# Patient Record
Sex: MALE | Race: WHITE | NOT HISPANIC OR LATINO | Employment: OTHER | URBAN - METROPOLITAN AREA
[De-identification: names, ages, dates, MRNs, and addresses within clinical notes are randomized per-mention and may not be internally consistent; named-entity substitution may affect disease eponyms.]

---

## 2017-03-21 ENCOUNTER — HISTORICAL (OUTPATIENT)
Dept: LAB | Facility: HOSPITAL | Age: 72
End: 2017-03-21

## 2017-03-22 ENCOUNTER — HISTORICAL (OUTPATIENT)
Dept: ANESTHESIOLOGY | Facility: HOSPITAL | Age: 72
End: 2017-03-22

## 2017-04-18 ENCOUNTER — HISTORICAL (OUTPATIENT)
Dept: CARDIAC REHAB | Facility: HOSPITAL | Age: 72
End: 2017-04-18

## 2017-04-19 ENCOUNTER — HISTORICAL (OUTPATIENT)
Dept: CARDIAC REHAB | Facility: HOSPITAL | Age: 72
End: 2017-04-19

## 2017-04-21 ENCOUNTER — HISTORICAL (OUTPATIENT)
Dept: CARDIAC REHAB | Facility: HOSPITAL | Age: 72
End: 2017-04-21

## 2017-04-24 ENCOUNTER — HISTORICAL (OUTPATIENT)
Dept: CARDIAC REHAB | Facility: HOSPITAL | Age: 72
End: 2017-04-24

## 2017-04-26 ENCOUNTER — HISTORICAL (OUTPATIENT)
Dept: CARDIAC REHAB | Facility: HOSPITAL | Age: 72
End: 2017-04-26

## 2017-04-28 ENCOUNTER — HISTORICAL (OUTPATIENT)
Dept: CARDIAC REHAB | Facility: HOSPITAL | Age: 72
End: 2017-04-28

## 2017-05-01 ENCOUNTER — HISTORICAL (OUTPATIENT)
Dept: CARDIAC REHAB | Facility: HOSPITAL | Age: 72
End: 2017-05-01

## 2017-05-03 ENCOUNTER — HISTORICAL (OUTPATIENT)
Dept: CARDIAC REHAB | Facility: HOSPITAL | Age: 72
End: 2017-05-03

## 2017-05-05 ENCOUNTER — HISTORICAL (OUTPATIENT)
Dept: CARDIAC REHAB | Facility: HOSPITAL | Age: 72
End: 2017-05-05

## 2017-05-08 ENCOUNTER — HISTORICAL (OUTPATIENT)
Dept: CARDIAC REHAB | Facility: HOSPITAL | Age: 72
End: 2017-05-08

## 2017-05-10 ENCOUNTER — HISTORICAL (OUTPATIENT)
Dept: CARDIAC REHAB | Facility: HOSPITAL | Age: 72
End: 2017-05-10

## 2017-05-15 ENCOUNTER — HISTORICAL (OUTPATIENT)
Dept: CARDIAC REHAB | Facility: HOSPITAL | Age: 72
End: 2017-05-15

## 2017-05-17 ENCOUNTER — HISTORICAL (OUTPATIENT)
Dept: CARDIAC REHAB | Facility: HOSPITAL | Age: 72
End: 2017-05-17

## 2017-05-19 ENCOUNTER — HISTORICAL (OUTPATIENT)
Dept: CARDIAC REHAB | Facility: HOSPITAL | Age: 72
End: 2017-05-19

## 2017-05-22 ENCOUNTER — HISTORICAL (OUTPATIENT)
Dept: CARDIAC REHAB | Facility: HOSPITAL | Age: 72
End: 2017-05-22

## 2017-05-24 ENCOUNTER — HISTORICAL (OUTPATIENT)
Dept: CARDIAC REHAB | Facility: HOSPITAL | Age: 72
End: 2017-05-24

## 2017-05-26 ENCOUNTER — HISTORICAL (OUTPATIENT)
Dept: CARDIAC REHAB | Facility: HOSPITAL | Age: 72
End: 2017-05-26

## 2017-05-31 ENCOUNTER — HISTORICAL (OUTPATIENT)
Dept: CARDIAC REHAB | Facility: HOSPITAL | Age: 72
End: 2017-05-31

## 2017-07-27 ENCOUNTER — HISTORICAL (OUTPATIENT)
Dept: LAB | Facility: HOSPITAL | Age: 72
End: 2017-07-27

## 2017-08-23 ENCOUNTER — HISTORICAL (OUTPATIENT)
Dept: ENDOSCOPY | Facility: HOSPITAL | Age: 72
End: 2017-08-23

## 2018-03-15 ENCOUNTER — HISTORICAL (OUTPATIENT)
Dept: LAB | Facility: HOSPITAL | Age: 73
End: 2018-03-15

## 2018-03-15 ENCOUNTER — HISTORICAL (OUTPATIENT)
Dept: ADMINISTRATIVE | Facility: HOSPITAL | Age: 73
End: 2018-03-15

## 2018-03-15 LAB
(HCYS)2 SERPL-MCNC: 8.8 MCMOL/L (ref 3.2–10.7)
ALBUMIN SERPL-MCNC: 4.8 GM/DL (ref 3.4–5)
ALBUMIN/GLOB SERPL: 2.67 {RATIO} (ref 1.5–2.5)
ALP SERPL-CCNC: 69 UNIT/L (ref 38–126)
ALT SERPL-CCNC: 55 UNIT/L (ref 7–52)
AST SERPL-CCNC: 34 UNIT/L (ref 15–37)
BILIRUB SERPL-MCNC: 0.8 MG/DL (ref 0.2–1)
BILIRUBIN DIRECT+TOT PNL SERPL-MCNC: <0.2 MG/DL (ref 0–0.5)
BUN SERPL-MCNC: 13 MG/DL (ref 7–18)
CALCIUM SERPL-MCNC: 9.9 MG/DL (ref 8.5–10)
CHLORIDE SERPL-SCNC: 105 MMOL/L (ref 98–107)
CHOLEST SERPL-MCNC: 316 MG/DL (ref 0–200)
CHOLEST/HDLC SERPL: 8.3 {RATIO}
CO2 SERPL-SCNC: 30 MMOL/L (ref 21–32)
CREAT SERPL-MCNC: 1.05 MG/DL (ref 0.6–1.3)
CREAT/UREA NIT SERPL: 12.4
GGT SERPL-CCNC: 57 UNIT/L (ref 5–85)
GLOBULIN SER-MCNC: 1.8 GM/DL (ref 1.2–3)
GLUCOSE SERPL-MCNC: 99 MG/DL (ref 74–106)
HDLC SERPL-MCNC: 38 MG/DL (ref 35–60)
LDH SERPL-CCNC: 136 UNIT/L (ref 140–271)
LDLC SERPL CALC-MCNC: 150 MG/DL (ref 0–129)
POTASSIUM SERPL-SCNC: 4.4 MMOL/L (ref 3.5–5.1)
PROT SERPL-MCNC: 6.6 GM/DL (ref 6.4–8.2)
SODIUM SERPL-SCNC: 139 MMOL/L (ref 136–145)
T3FREE SERPL-MCNC: 1.93 PG/ML (ref 1.45–3.48)
T4 FREE SERPL-MCNC: <1 NG/DL (ref 0.76–1.46)
TRIGL SERPL-MCNC: 371 MG/DL (ref 30–150)
TSH SERPL-ACNC: 1.09 MIU/ML (ref 0.35–4.94)
VLDLC SERPL CALC-MCNC: 74.2 MG/DL

## 2018-09-05 ENCOUNTER — HISTORICAL (OUTPATIENT)
Dept: LAB | Facility: HOSPITAL | Age: 73
End: 2018-09-05

## 2018-09-05 ENCOUNTER — HISTORICAL (OUTPATIENT)
Dept: ADMINISTRATIVE | Facility: HOSPITAL | Age: 73
End: 2018-09-05

## 2018-09-05 LAB
ALBUMIN SERPL-MCNC: 4.5 GM/DL (ref 3.4–5)
ALBUMIN/GLOB SERPL: 1.55 {RATIO} (ref 1.5–2.5)
ALP SERPL-CCNC: 60 UNIT/L (ref 38–126)
ALT SERPL-CCNC: 27 UNIT/L (ref 7–52)
AST SERPL-CCNC: 23 UNIT/L (ref 15–37)
BILIRUB SERPL-MCNC: 0.6 MG/DL (ref 0.2–1)
BILIRUBIN DIRECT+TOT PNL SERPL-MCNC: 0.1 MG/DL (ref 0–0.5)
BILIRUBIN DIRECT+TOT PNL SERPL-MCNC: 0.5 MG/DL
BUN SERPL-MCNC: 14 MG/DL (ref 7–18)
CALCIUM SERPL-MCNC: 9.4 MG/DL (ref 8.5–10)
CHLORIDE SERPL-SCNC: 106 MMOL/L (ref 98–107)
CHOLEST SERPL-MCNC: 235 MG/DL (ref 0–200)
CHOLEST/HDLC SERPL: 5.9 {RATIO}
CO2 SERPL-SCNC: 29 MMOL/L (ref 21–32)
CREAT SERPL-MCNC: 0.89 MG/DL (ref 0.6–1.3)
DEPRECATED CALCIDIOL+CALCIFEROL SERPL-MC: 90.1 NG/ML (ref 30–80)
EST. AVERAGE GLUCOSE BLD GHB EST-MCNC: 97 MG/DL
GLOBULIN SER-MCNC: 2.9 GM/DL (ref 1.2–3)
GLUCOSE SERPL-MCNC: 106 MG/DL (ref 74–106)
HBA1C MFR BLD: 5 % (ref 4.4–6.4)
HDLC SERPL-MCNC: 40 MG/DL (ref 35–60)
LDLC SERPL CALC-MCNC: 127 MG/DL (ref 0–129)
POTASSIUM SERPL-SCNC: 4.5 MMOL/L (ref 3.5–5.1)
PROT SERPL-MCNC: 7.4 GM/DL (ref 6.4–8.2)
SODIUM SERPL-SCNC: 140 MMOL/L (ref 136–145)
T3FREE SERPL-MCNC: 2.34 PG/ML (ref 2.18–3.98)
TRIGL SERPL-MCNC: 297 MG/DL (ref 30–150)
TSH SERPL-ACNC: 0.65 MIU/L (ref 0.36–3.74)
VLDLC SERPL CALC-MCNC: 59.4 MG/DL

## 2018-09-06 LAB — T4 FREE SERPL-MCNC: 1.08 NG/DL (ref 0.76–1.46)

## 2018-10-17 ENCOUNTER — HOSPITAL ENCOUNTER (OUTPATIENT)
Dept: MEDSURG UNIT | Facility: HOSPITAL | Age: 73
End: 2018-10-20
Attending: INTERNAL MEDICINE | Admitting: INTERNAL MEDICINE

## 2018-10-17 LAB
ABS NEUT (OLG): 4.03 X10(3)/MCL (ref 2.1–9.2)
ALBUMIN SERPL-MCNC: 3.7 GM/DL (ref 3.4–5)
ALBUMIN/GLOB SERPL: 1.1 RATIO (ref 1.1–2)
ALP SERPL-CCNC: 92 UNIT/L (ref 50–136)
ALT SERPL-CCNC: 48 UNIT/L (ref 12–78)
AST SERPL-CCNC: 28 UNIT/L (ref 15–37)
BASOPHILS # BLD AUTO: 0.1 X10(3)/MCL (ref 0–0.2)
BASOPHILS NFR BLD AUTO: 1 %
BILIRUB SERPL-MCNC: 0.4 MG/DL (ref 0.2–1)
BILIRUBIN DIRECT+TOT PNL SERPL-MCNC: 0.1 MG/DL (ref 0–0.5)
BILIRUBIN DIRECT+TOT PNL SERPL-MCNC: 0.3 MG/DL (ref 0–0.8)
BUN SERPL-MCNC: 15 MG/DL (ref 7–18)
CALCIUM SERPL-MCNC: 9 MG/DL (ref 8.5–10.1)
CHLORIDE SERPL-SCNC: 104 MMOL/L (ref 98–107)
CO2 SERPL-SCNC: 29 MMOL/L (ref 21–32)
CREAT SERPL-MCNC: 1.2 MG/DL (ref 0.7–1.3)
EOSINOPHIL # BLD AUTO: 0.3 X10(3)/MCL (ref 0–0.9)
EOSINOPHIL NFR BLD AUTO: 3 %
ERYTHROCYTE [DISTWIDTH] IN BLOOD BY AUTOMATED COUNT: 12.5 % (ref 11.5–17)
GLOBULIN SER-MCNC: 3.5 GM/DL (ref 2.4–3.5)
GLUCOSE SERPL-MCNC: 89 MG/DL (ref 74–106)
HCT VFR BLD AUTO: 44.7 % (ref 42–52)
HGB BLD-MCNC: 15.2 GM/DL (ref 14–18)
LYMPHOCYTES # BLD AUTO: 4 X10(3)/MCL (ref 0.6–4.6)
LYMPHOCYTES NFR BLD AUTO: 42 %
MCH RBC QN AUTO: 30.5 PG (ref 27–31)
MCHC RBC AUTO-ENTMCNC: 34 GM/DL (ref 33–36)
MCV RBC AUTO: 89.8 FL (ref 80–94)
MONOCYTES # BLD AUTO: 0.9 X10(3)/MCL (ref 0.1–1.3)
MONOCYTES NFR BLD AUTO: 10 %
NEUTROPHILS # BLD AUTO: 4.03 X10(3)/MCL (ref 2.1–9.2)
NEUTROPHILS NFR BLD AUTO: 43 %
PLATELET # BLD AUTO: 200 X10(3)/MCL (ref 130–400)
PMV BLD AUTO: 9.4 FL (ref 9.4–12.4)
POC TROPONIN: 0.01 NG/ML (ref 0–0.08)
POTASSIUM SERPL-SCNC: 3.6 MMOL/L (ref 3.5–5.1)
PROT SERPL-MCNC: 7.2 GM/DL (ref 6.4–8.2)
RBC # BLD AUTO: 4.98 X10(6)/MCL (ref 4.7–6.1)
SODIUM SERPL-SCNC: 139 MMOL/L (ref 136–145)
TROPONIN I SERPL-MCNC: <0.02 NG/ML (ref 0.02–0.49)
WBC # SPEC AUTO: 9.4 X10(3)/MCL (ref 4.5–11.5)

## 2018-10-18 LAB
TROPONIN I SERPL-MCNC: 0.27 NG/ML (ref 0.02–0.49)
TROPONIN I SERPL-MCNC: 0.44 NG/ML (ref 0.02–0.49)
TROPONIN I SERPL-MCNC: 0.45 NG/ML (ref 0.02–0.49)

## 2018-10-20 LAB
ABS NEUT (OLG): 4.96 X10(3)/MCL (ref 2.1–9.2)
ALBUMIN SERPL-MCNC: 3.3 GM/DL (ref 3.4–5)
ALBUMIN/GLOB SERPL: 1.2 RATIO (ref 1.1–2)
ALP SERPL-CCNC: 67 UNIT/L (ref 50–136)
ALT SERPL-CCNC: 43 UNIT/L (ref 12–78)
AST SERPL-CCNC: 27 UNIT/L (ref 15–37)
BASOPHILS # BLD AUTO: 0 X10(3)/MCL (ref 0–0.2)
BASOPHILS NFR BLD AUTO: 1 %
BILIRUB SERPL-MCNC: 0.4 MG/DL (ref 0.2–1)
BILIRUBIN DIRECT+TOT PNL SERPL-MCNC: 0.1 MG/DL (ref 0–0.5)
BILIRUBIN DIRECT+TOT PNL SERPL-MCNC: 0.3 MG/DL (ref 0–0.8)
BUN SERPL-MCNC: 16 MG/DL (ref 7–18)
CALCIUM SERPL-MCNC: 8.6 MG/DL (ref 8.5–10.1)
CHLORIDE SERPL-SCNC: 107 MMOL/L (ref 98–107)
CO2 SERPL-SCNC: 28 MMOL/L (ref 21–32)
CREAT SERPL-MCNC: 0.94 MG/DL (ref 0.7–1.3)
EOSINOPHIL # BLD AUTO: 0.2 X10(3)/MCL (ref 0–0.9)
EOSINOPHIL NFR BLD AUTO: 2 %
ERYTHROCYTE [DISTWIDTH] IN BLOOD BY AUTOMATED COUNT: 12.4 % (ref 11.5–17)
GLOBULIN SER-MCNC: 2.8 GM/DL (ref 2.4–3.5)
GLUCOSE SERPL-MCNC: 91 MG/DL (ref 74–106)
HCT VFR BLD AUTO: 46.4 % (ref 42–52)
HGB BLD-MCNC: 15.2 GM/DL (ref 14–18)
LYMPHOCYTES # BLD AUTO: 2.4 X10(3)/MCL (ref 0.6–4.6)
LYMPHOCYTES NFR BLD AUTO: 29 %
MCH RBC QN AUTO: 29.9 PG (ref 27–31)
MCHC RBC AUTO-ENTMCNC: 32.8 GM/DL (ref 33–36)
MCV RBC AUTO: 91.2 FL (ref 80–94)
MONOCYTES # BLD AUTO: 0.7 X10(3)/MCL (ref 0.1–1.3)
MONOCYTES NFR BLD AUTO: 8 %
NEUTROPHILS # BLD AUTO: 4.96 X10(3)/MCL (ref 2.1–9.2)
NEUTROPHILS NFR BLD AUTO: 59 %
PLATELET # BLD AUTO: 178 X10(3)/MCL (ref 130–400)
PMV BLD AUTO: 9.1 FL (ref 9.4–12.4)
POTASSIUM SERPL-SCNC: 4.3 MMOL/L (ref 3.5–5.1)
PROT SERPL-MCNC: 6.1 GM/DL (ref 6.4–8.2)
RBC # BLD AUTO: 5.09 X10(6)/MCL (ref 4.7–6.1)
SODIUM SERPL-SCNC: 142 MMOL/L (ref 136–145)
WBC # SPEC AUTO: 8.4 X10(3)/MCL (ref 4.5–11.5)

## 2019-02-27 ENCOUNTER — HISTORICAL (OUTPATIENT)
Dept: ADMINISTRATIVE | Facility: HOSPITAL | Age: 74
End: 2019-02-27

## 2019-02-27 LAB
FLUAV AG UPPER RESP QL IA.RAPID: NEGATIVE
FLUBV AG UPPER RESP QL IA.RAPID: NEGATIVE

## 2019-12-02 ENCOUNTER — HISTORICAL (OUTPATIENT)
Dept: LAB | Facility: HOSPITAL | Age: 74
End: 2019-12-02

## 2020-08-18 ENCOUNTER — HISTORICAL (OUTPATIENT)
Dept: PREADMISSION TESTING | Facility: HOSPITAL | Age: 75
End: 2020-08-18

## 2020-08-18 LAB — PSA SERPL-MCNC: 0.38 NG/ML

## 2021-09-07 ENCOUNTER — HISTORICAL (OUTPATIENT)
Dept: ADMINISTRATIVE | Facility: HOSPITAL | Age: 76
End: 2021-09-07

## 2021-09-07 LAB
ALBUMIN SERPL-MCNC: 4.5 GM/DL (ref 3.4–5)
ALBUMIN/GLOB SERPL: 2.25 {RATIO} (ref 1.5–2.5)
ALP SERPL-CCNC: 62 UNIT/L (ref 38–126)
ALT SERPL-CCNC: 41 UNIT/L (ref 7–52)
AST SERPL-CCNC: 32 UNIT/L (ref 15–37)
BILIRUB SERPL-MCNC: 0.5 MG/DL (ref 0.2–1)
BILIRUBIN DIRECT+TOT PNL SERPL-MCNC: 0.1 MG/DL (ref 0–0.5)
BILIRUBIN DIRECT+TOT PNL SERPL-MCNC: 0.4 MG/DL
BUN SERPL-MCNC: 16 MG/DL (ref 7–18)
CALCIUM SERPL-MCNC: 9.3 MG/DL (ref 8.5–10.1)
CHLORIDE SERPL-SCNC: 105 MMOL/L (ref 98–107)
CHOLEST SERPL-MCNC: 159 MG/DL (ref 0–200)
CHOLEST/HDLC SERPL: 3.3 {RATIO}
CO2 SERPL-SCNC: 30 MMOL/L (ref 21–32)
CREAT SERPL-MCNC: 1.08 MG/DL (ref 0.6–1.3)
GLOBULIN SER-MCNC: 2 GM/DL (ref 1.2–3)
GLUCOSE SERPL-MCNC: 101 MG/DL (ref 74–106)
HDLC SERPL-MCNC: 48 MG/DL (ref 35–60)
LDLC SERPL CALC-MCNC: 50 MG/DL (ref 0–129)
POTASSIUM SERPL-SCNC: 4 MMOL/L (ref 3.5–5.1)
PROT SERPL-MCNC: 6.5 GM/DL (ref 6.4–8.2)
SODIUM SERPL-SCNC: 142 MMOL/L (ref 136–145)
TRIGL SERPL-MCNC: 268 MG/DL (ref 30–150)
TSH SERPL-ACNC: 0.84 MIU/ML (ref 0.35–4.94)
VLDLC SERPL CALC-MCNC: 53.6 MG/DL

## 2021-11-15 ENCOUNTER — HISTORICAL (OUTPATIENT)
Dept: ADMINISTRATIVE | Facility: HOSPITAL | Age: 76
End: 2021-11-15

## 2021-11-15 LAB — SARS-COV-2 AG RESP QL IA.RAPID: NEGATIVE

## 2021-11-16 ENCOUNTER — HISTORICAL (OUTPATIENT)
Dept: ADMINISTRATIVE | Facility: HOSPITAL | Age: 76
End: 2021-11-16

## 2022-04-11 ENCOUNTER — HISTORICAL (OUTPATIENT)
Dept: ADMINISTRATIVE | Facility: HOSPITAL | Age: 77
End: 2022-04-11

## 2022-04-25 VITALS
HEIGHT: 73 IN | OXYGEN SATURATION: 96 % | WEIGHT: 216.06 LBS | SYSTOLIC BLOOD PRESSURE: 110 MMHG | BODY MASS INDEX: 28.63 KG/M2 | DIASTOLIC BLOOD PRESSURE: 60 MMHG

## 2022-04-30 NOTE — OP NOTE
DATE OF SURGERY:    11/16/2021    SURGEON:  Jerald Powers MD    PREOPERATIVE DIAGNOSIS:  Left renal calculus.    POSTOPERATIVE DIAGNOSIS:  Left renal calculus.    PROCEDURE:    1. Cystoscopy with left retrograde pyelogram.  2. Left ureteroscopy with laser lithotripsy.  3. Left ureteral stent 6-Nigerien by 28 cm.    OPERATIVE NOTE:  After informed consent was obtained, the patient was taken to the operating room.  After receiving a preoperative dose of antibiotics, he was given a general anesthetic and placed in a dorsal lithotomy position.  His genitals were prepped and draped in usual sterile fashion.  I passed a 22-Nigerien rigid scope through the urethra into the bladder.  The left ureteral orifice was identified.  I cannulated it with an open-ended ureteral catheter.  I performed a retrograde pyelogram which showed normal caliber ureter.  He did have a narrowing at the ureteropelvic junction with a mildly dilated renal pelvis.  I placed a 2nd Glidewire up and passed a flexible scope up into the proximal ureter where a small stone was seen on CT scan after emergency room visit several weeks ago.  There was no stone up in the proximal ureter.  I went up in the renal pelvis and I investigated the upper, middle, and lower pole calices.  He had a relatively large stone in the lower pole calyx and I lasered this with a 200 micron fiber and the Holmium laser and used a Nitinol basket to remove several of the fragments.  Following this, I placed a 6-Nigerien by 28 cm double-J stent into good position.  He tolerated the procedure well.  There were no complications.  He was extubated and brought to recovery in good condition.        ______________________________  Jerald Powers MD    WBR/SF  DD:  11/16/2021  Time:  07:11PM  DT:  11/16/2021  Time:  07:31PM  Job #:  [id number]

## 2022-04-30 NOTE — ED PROVIDER NOTES
Patient:   Lionel Brown            MRN: [id number]            FIN: [id number]               Age:   73 years     Sex:  Male     :  1945   Associated Diagnoses:   Unstable angina   Author:   Tere Wells MD      Basic Information   Time seen: Date & time 10/17/2018 20:29:00.   History source: Patient.   Arrival mode: Private vehicle, wheelchair.   Additional information: Chief Complaint from Nursing Triage Note : Chief Complaint   10/17/2018 20:23 CDT     Chief Complaint           pt presents to ED with c/o chest pain and left arm pain x 10 min. MI approx 1.5 yrs ago. bypass in . 6 stents placed. pt denies any SOB.  .      History of Present Illness   The patient presents with chest pain, patient presents to ER today with complaint of chest pain that radiates to his left arm for the past 10 minutes.  Patient had a heart attack 1.5 years ago.  Had bypass surgery in .  Denies shortness of breath./ eddie figueroa pa-c and I, Tere Wells MD, assumed care of this patient at .     73-year-old male with a history of coronary artery disease status post coronary artery bypass surgery in  and 6 subsequent stents, last MI 2017 presents to the emergency department complaining of chest pain.  He states that around 6:00 tonight he started feeling some general discomfort in his chest she took a nitroglycerin and the symptoms improved.  Approximately one hour later he started developing left arm pain and chest tightness again, he took another nitroglycerin at that time and again symptoms improved.  Around 8 PM the left arm pain became severe, similar to his prior MI pain, so he took an additional nitroglycerin and came to the emergency department.  At time of my evaluation, his symptoms are resolved.  He denies any associated shortness of breath, diaphoresis, nausea or vomiting.  He denies any recent fever or chills.  Patient took 324 mg of aspirin prior to coming to the emergency  department.  The onset was 3  hours ago.  The course/duration of symptoms is fluctuating in intensity.  Location: Left anterior chest. Radiating pain: left arm. The character of symptoms is tightness and sharp.  The degree at onset was moderate.  The degree at maximum was severe.  The degree at present is none.  The exacerbating factor is none.  Risk factors consist of coronary artery disease.  Prior episodes: angina, coronary artery disease and last myocardial infarction was  04/2017.  Therapy today Nitroglycerin and Aspirin.  Associated symptoms: none.        Review of Systems   Constitutional symptoms:  No fever, no chills.    Skin symptoms:  No jaundice, no rash.    Eye symptoms:  Vision unchanged, No blurred vision,    Respiratory symptoms:  No shortness of breath, no orthopnea, no cough, no sputum production.    Cardiovascular symptoms:  Chest pain, no palpitations, no diaphoresis, no peripheral edema.    Gastrointestinal symptoms:  No abdominal pain, no nausea, no vomiting, no diarrhea, no constipation.    Genitourinary symptoms:  No dysuria, no hematuria.    Musculoskeletal symptoms:  Muscle pain.   Neurologic symptoms:  No headache, no dizziness.    Allergy/immunologic symptoms:  No impaired immunity,              Additional review of systems information: All other systems reviewed and otherwise negative.      Health Status   Allergies:    Allergic Reactions (Selected)  Severity Not Documented  Codeine- No reactions were documented.  Cortisone- Glaucoma.,    Allergies (2) Active Reaction  codeine None Documented  cortisone glaucoma.   Medications:  (Selected)   Prescriptions  Prescribed  Promethazine DM 6.25 mg-15 mg/5 mL oral syrup: 5 mL, Oral, q6hr, PRN PRN for cough, # 120 mL, 0 Refill(s), [facility]  amoxicillin 500 mg oral capsule: 500 mg = 1 cap(s), Oral, BID, # 14 cap(s), 1 Refill(s), [facility]  metoprolol tartrate 25 mg oral tab: 12.5 mg =  0.5 tab(s), Oral, BID, # 15 tab(s), 0 Refill(s)  ticagrelor 90 mg oral tablet: 90 mg = 1 tab(s), Oral, BID, # 60 tab(s), 0 Refill(s)  Documented Medications  Documented  Acidophilus oral capsule: = 1 cap(s), Oral, Daily, 0 Refill(s)  LATANOPROST 0.005% EYE DROPS:   Lexapro 10 mg oral tablet: 0 Refill(s)  Plavix 75 mg oral tablet: 0 Refill(s)  Tumeric: Tumeric, 1 capsule, Oral, Daily, 0 Refill(s)  Vitamin D 1000 intl units oral tablet: 5000iu, Oral, BID, 5000 IU BID, 0 Refill(s)  aspirin 81 mg oral tablet, CHEWABLE: 81 mg = 1 tab(s), Oral, Daily, # 30 tab(s), 0 Refill(s)  glucosamine hydrochloride 1500 mg oral tablet: 1,500 mg = 1 tab(s), Oral, Daily, # 30 tab(s), 0 Refill(s)  multivitamin with minerals (Adult Tab): 1 tab(s), Oral, Daily, 0 Refill(s)  omega-3 polyunsaturated fatty acids oral cap: See Instructions, 1400 mg daily, 0 Refill(s)  red yeast rice 600 mg oral capsule: 1,200 mg = 2 cap(s), Oral, BID, 0 Refill(s)  vitamin E: 0 Refill(s).      Past Medical/ Family/ Social History   Medical history:    Active  CAD (coronary artery disease) (414.00)  Resolved  hearing loss (79659591):  Resolved.  visual disorder (301042311):  Resolved.  high cholesterol (909420201):  Resolved.  sleep apnea (557633787):  Resolved.  arthritis (6420915):  Resolved.  anxiety (73276114):  Resolved.  depression (86880309):  Resolved., Reviewed as documented in chart.   Surgical history:    Colonoscopy on 8/23/2017 at 72 Years.  Comments:  8/23/2017 10:27 - Alvina PRUITT, Erik Victoria  auto-populated from documented surgical case  History of coronary artery stent placement ([id number]) on 4/7/2017 at 72 Years.  History of coronary artery stent placement ([id number]) on 8/20/2015 at 70 Years.  Coronary artery bypass, vein only; 2 coronary venous grafts (18554) in 1992 at 47 Years.  Appendectomy; (08006).  Cholecystectomy; (63793).  tonsilectomy., Reviewed as documented in chart.   Family history:    Primary malignant neoplasm of  kidney  Brother (Zachary)  Stroke  Mother  Accidental  Brother ()  Coronary artery disease  Father  Mother  Brother ()  Brother (Zachary)  Hypertension.  Brother ()  Brother (Shakeel)  Glaucoma.  Mother  , Reviewed as documented in chart.   Social history: Reviewed as documented in chart.   Problem list:    Active Problems (8)  Ankylosing spondylitis   CAD (coronary artery disease)   Cyst of kidney   Fatty liver   Glaucoma   Hypercholesterolemia   Hypertriglyceridemia   Sleep apnea .      Physical Examination               Vital Signs   Vital Signs   10/17/2018 20:23 CDT     Temperature Oral          36.8 DegC                             Temperature Oral (calculated)             98.24 DegF                             Peripheral Pulse Rate     60 bpm                             Respiratory Rate          19 br/min                             SpO2                      99 %                             Oxygen Therapy            Aerosol mask                             Systolic Blood Pressure   134 mmHg                             Diastolic Blood Pressure  79 mmHg  .      Vital Signs (last 24 hrs)_____  Last Charted___________  Temp Oral     36.8 DegC  (OCT 17 20:23)  Heart Rate Peripheral   60 bpm  (OCT 17 20:23)  Resp Rate         19 br/min  (OCT 17 20:)  SBP      134 mmHg  (OCT 17 20:)  DBP      79 mmHg  (OCT 17 20:)  SpO2      99 %  (OCT 17 20:23).   Measurements   10/17/2018 20:23 CDT     Weight Dosing             95.5 kg                             Weight Measured and Calculated in Lbs     210.54 lb                             Weight Estimated          95.5 kg                             Height/Length Dosing      182.88 cm                             Height/Length Estimated   182.88 cm                             Body Mass Index Estimated 28.55 kg/m2  .   Basic Oxygen Information   10/17/2018 20:23 CDT     SpO2                      99 %                             Oxygen Therapy             Aerosol mask  .   General:  Alert, no acute distress.    Skin:  Warm, dry.    Head:  Normocephalic, atraumatic.    Neck:  Supple, trachea midline.    Eye:  Normal conjunctiva.   Ears, nose, mouth and throat:  Oral mucosa moist.   Cardiovascular:  Regular rate and rhythm, No murmur, Normal peripheral perfusion, No edema.    Respiratory:  Lungs are clear to auscultation, respirations are non-labored.    Gastrointestinal:  Soft, Nontender, Non distended, Normal bowel sounds.    Neurological:  Alert and oriented to person, place, time, and situation, No focal neurological deficit observed.    Psychiatric:  Cooperative.      Medical Decision Making   Differential Diagnosis:  Myocardial infarction, non-ST elevation myocardial infarction, unstable angina, angina, anxiety.    Rationale:  Patient has extensive cardiac history, now w/ LUE pain and chest pressure, worsening over the last 3 hours w/ severe pain 10 minutes PTA. Now alleviated completely upon arrival to ED. ECG when having pain w/ submillimeter elevation in III, no STEMI criterion met and resolved with resolution of the pain; however, new T wave changes in mulitple leads noted compared to prior ECG. Patient took aspirin prior to arrival. His story is certainly concerning for ACS/Unstable angina though initial cardiac enzymes are unremarkable. Case was discussed with his cardiologist, Dr. Morse, who agrees w/ admission, serial enzymes. Requests hold anticoagulation for now, will reevaluate in AM. Findings and plan discussed with the patient, and he is agreeable to admission at this time.   .   Documents reviewed:  Emergency department nurses' notes.   Orders  Launch Order Profile (Selected)   Inpatient Orders  Ordered  Cardiac Monitoring: 10/17/18 20:30:00 CDT, Constant Order  Discharge Planning Ongoing Assessment: 10/20/18 9:00:00 CDT, q3day  O2 Therapy: 10/17/18 20:26:00 CDT  Saline Lock Insert: 10/17/18 20:31:00 CDT, Stop date 10/17/18 20:31:00  CDT  nitroglycerin 0.4 mg sublingual TAB: 0.4 mg, form: Tab-SL, SL, q5min PRN for chest pain, Order duration: 3 dose(s), first dose 10/17/18 20:41:00 CDT, stop date Limited # of times, STAT  Ordered (Collected)  POC iSTAT ER Troponin request: BLOOD, STAT collect, Collected, 10/17/18 20:35:54 CDT, Stop date 10/17/18 20:35:00 CDT, Lab Collect, Print Label By Order Location  Ordered (Exam Completed)  XR Chest 1 View: Stat, 10/17/18 20:30:00 CDT, Chest Pain, None, Patient Bed, Patient Has IV?, Patient on Oxygen?, Rad Type, Not Scheduled, 10/17/18 20:30:00 CDT  Ordered (In-Lab)  Automated Diff: NOW collect, 10/17/18 20:35:00 CDT, Blood, Collected, Stop date 10/17/18 20:35:00 CDT, Lab Collect, Print Label By Order Location, 10/17/18 20:30:00 CDT  CBC w/ Auto Diff: NOW collect, 10/17/18 20:35:54 CDT, BLOOD, Collected, Stop date 10/17/18 20:35:00 CDT, Lab Collect  CMP: STAT collect, 10/17/18 20:35:54 CDT, BLOOD, Collected, Stop date 10/17/18 20:35:00 CDT, Lab Collect  Troponin-I: STAT collect, 10/17/18 20:35:54 CDT, BLOOD, Collected, Stop date 10/17/18 20:35:00 CDT, Lab Collect  Completed  EKG Adult 12 Lead: 10/17/18 20:30:00 CDT, Stat, Once, 10/17/18 20:30:00 CDT, Patient Bed, Patient Has IV, Patient on O2, Standard Precautions, -1, -1, 10/17/18 20:30:00 CDT  EKG Adult 12 Lead: 10/17/18 20:40:00 CDT, Stat, Once, 10/17/18 20:40:00 CDT, Patient Bed, Patient Has IV, Patient on O2, Standard Precautions, -1, -1, 10/17/18 20:40:00 CDT  POC Istat ER Troponin: Blood, Stat collect, Collected, 10/17/18 20:40:00 CDT  aspirin 81 mg oral tablet, CHEWABLE: 324 mg, form: Tab-Chew, Oral, Once, first dose 10/17/18 20:30:00 CDT, stop date 10/17/18 20:30:00 CDT, STAT, 4 chew tab = 5 grain ASA.   Electrocardiogram:  Time 10/17/2018 20:27:00, rate 60, normal sinus rhythm, STT segments Septal Q waves, submillimeter ST elevation in lead III. , T wave Inversion, I, , AVL, Ectopy None, P wave and WI interval 1st degree atrioventricular block,  Previous EKG available Compared with 3/7/2018 23:00:00, Subtle changes in inferior leads, previous ST and T wave flattening, now with submillimeter ST elevation in III, previous T-wave flattening in 1 and aVL, now T wave inversions, minor ST depressions in V4, V5, Interpretation by Emergency Physician Ischemic changes.    Electrocardiogram:  Time 10/17/2018 20:49:00, rate 60, normal sinus rhythm, P wave and OR interval 1st degree atrioventricular block, Resolution of previously demonstrated sub-millimeter ST elevations in inferior leads, resolution of previously demonstrated ST depressions and lateral T-wave flattening.    Results review:  Lab results : Lab View   10/17/2018 20:54 CDT     Troponin I Poc            0.01    10/17/2018 20:40 CDT     POC Troponin              0.01 ng/mL    10/17/2018 20:35 CDT     Sodium Lvl                139 mmol/L                             Potassium Lvl             3.6 mmol/L                             Chloride                  104 mmol/L                             CO2                       29.0 mmol/L                             Calcium Lvl               9.0 mg/dL                             Glucose Lvl               89 mg/dL                             BUN                       15.0 mg/dL                             Creatinine                1.20 mg/dL                             eGFR-AA                   >60 mL/min/1.73 m2  NA                             eGFR-SHAILA                  >60 mL/min/1.73 m2  NA                             Bili Total                0.4 mg/dL                             Bili Direct               0.10 mg/dL                             Bili Indirect             0.30 mg/dL                             AST                       28 unit/L                             ALT                       48 unit/L                             Alk Phos                  92 unit/L                             Total Protein             7.2 gm/dL                              Albumin Lvl               3.70 gm/dL                             Globulin                  3.50 gm/dL                             A/G Ratio                 1.1 ratio                             Troponin-I                <0.02 ng/mL                             WBC                       9.4 x10(3)/mcL                             RBC                       4.98 x10(6)/mcL                             Hgb                       15.2 gm/dL                             Hct                       44.7 %                             Platelet                  200 x10(3)/mcL                             MCV                       89.8 fL                             MCH                       30.5 pg                             MCHC                      34.0 gm/dL                             RDW                       12.5 %                             MPV                       9.4 fL                             Abs Neut                  4.03 x10(3)/mcL                             Neutro Auto               43 %  NA                             Lymph Auto                42 %  NA                             Mono Auto                 10 %  NA                             Eos Auto                  3 %  NA                             Abs Eos                   0.3 x10(3)/mcL                             Basophil Auto             1 %  NA                             Abs Neutro                4.03 x10(3)/mcL                             Abs Lymph                 4.0 x10(3)/mcL                             Abs Mono                  0.9 x10(3)/mcL                             Abs Baso                  0.1 x10(3)/mcL  , Interpretation Labs unremarkable.    Chest X-Ray:  No acute disease process, interpretation by Emergency Physician, Poststernotomy changes otherwise no acute cardiopulmonary process.       Reexamination/ Reevaluation   Vital signs   results included from flowsheet : Vital Signs   10/17/2018 20:48 CDT     Peripheral Pulse Rate     63 bpm                              Heart Rate Monitored      65 bpm                             Respiratory Rate          22 br/min  (Modified)                            SpO2                      98 %                             Oxygen Therapy            Room air                             Systolic Blood Pressure   99 mmHg                             Diastolic Blood Pressure  63 mmHg                             Mean Arterial Pressure, Cuff              75 mmHg     Basic Oxygen Information   10/17/2018 20:23 CDT     SpO2                      99 %                             Oxygen Therapy            Aerosol mask     Course: resolved.   Pain status: resolved.      Impression and Plan   Diagnosis   Unstable angina ([id number] I20.0)   Plan   Condition: Stable.    Disposition: Admit time  10/17/2018 22:31:00, Place in Observation Telemetry Unit, Mini Mackey MD, Juan PARRA    Counseled: Patient, Family, Regarding diagnosis, Regarding diagnostic results, Regarding treatment plan, Patient indicated understanding of instructions.

## 2022-04-30 NOTE — CONSULTS
Patient:   Lionel Brown            MRN: [id number]            FIN: [id number]               Age:   73 years     Sex:  Male     :  1945   Associated Diagnoses:   None   Author:   Onofre Allen MD      Basic Information   Source of history:  Self.    Referral source:  Self.    History limitation:  None.       Chief Complaint   10/17/2018 20:23 CDT     pt presents to ED with c/o chest pain and left arm pain x 10 min. MI approx 1.5 yrs ago. bypass in . 6 stents placed. pt denies any SOB.        History of Present Illness   70 y/o with extensive hx CAD s/p CABG / PTCA, HTN, HPL, who presentes to hosptial c/o chest tightness/pressure  no current chest pain/pressure  reports at baseline active arounfdhouse with no recent anginal type pain until this episode over last several days  no orthopnea, no pnd  no falls         Review of Systems   Constitutional:  No fever, No chills.    Respiratory:  Shortness of breath, No cough, No sputum production, No hemoptysis, No wheezing.    Cardiovascular:  Chest pain, No palpitations.    Gastrointestinal:  No nausea, No vomiting.    Genitourinary:  No dysuria, No hematuria.    Integumentary:  No rash.    Neurologic:  Alert and oriented X4.       Health Status   Allergies:    Allergic Reactions (Selected)  Severity Not Documented  Codeine- No reactions were documented.  Cortisone- Glaucoma.,    Allergies (2) Active Reaction  codeine None Documented  cortisone glaucoma   Current medications:  (Selected)   Inpatient Medications  Ordered  LATANOPROST 0.005% EYE DROPS: 1 drop, Eye-Both, Daily, first dose 10/19/18 9:00:00 CDT  Lexapro 10 mg oral tablet: 10 mg, form: Tab, Oral, Daily, first dose 10/19/18 9:00:00 CDT  Omega-3 1000 mg oral capsule: 1,000 mg, form: Cap, Oral, BID, first dose 10/18/18 21:00:00 CDT  Plavix 75 mg oral tablet: 75 mg, form: Tab, Oral, Daily, first dose 10/18/18 21:00:00 CDT  metoprolol tartrate 25 mg oral tab: 12.5 mg, form: Tab, Oral,  BID, first dose 10/18/18 21:00:00 CDT  multivitamin with minerals (Adult Tab): 1 tab(s), form: Tab, Oral, Daily, first dose 10/18/18 15:16:00 CDT  nitroglycerin 0.4 mg sublingual TAB: 0.4 mg, form: Tab-SL, SL, q5min PRN for chest pain, Order duration: 3 dose(s), first dose 10/17/18 20:41:00 CDT, stop date Limited # of times, STAT  vitamin E: 400 IntUnit, form: Cap, Oral, Daily, first dose 10/19/18 9:00:00 CDT  Prescriptions  Prescribed  metoprolol tartrate 25 mg oral tab: 12.5 mg = 0.5 tab(s), Oral, BID, # 15 tab(s), 0 Refill(s)  ticagrelor 90 mg oral tablet: 90 mg = 1 tab(s), Oral, BID, # 60 tab(s), 0 Refill(s)  Documented Medications  Documented  Acidophilus oral capsule: = 1 cap(s), Oral, Daily, 0 Refill(s)  LATANOPROST 0.005% EYE DROPS:   Lexapro 10 mg oral tablet: 0 Refill(s)  Plavix 75 mg oral tablet: 0 Refill(s)  Tumeric: Tumeric, 1 capsule, Oral, Daily, 0 Refill(s)  Vitamin D 1000 intl units oral tablet: 5000iu, Oral, BID, 5000 IU BID, 0 Refill(s)  glucosamine hydrochloride 1500 mg oral tablet: 1,500 mg = 1 tab(s), Oral, Daily, # 30 tab(s), 0 Refill(s)  multivitamin with minerals (Adult Tab): 1 tab(s), Oral, Daily, 0 Refill(s)  omega-3 polyunsaturated fatty acids oral cap: See Instructions, 1400 mg daily, 0 Refill(s)  red yeast rice 600 mg oral capsule: 1,200 mg = 2 cap(s), Oral, BID, 0 Refill(s)  vitamin E: 0 Refill(s),    Medications (8) Active  Scheduled: (7)  clopidogrel 75 mg Tab UD  75 mg 1 tab(s), Oral, Daily  escitalopram 10 mg Tab UD  10 mg 1 tab(s), Oral, Daily  LATANOPROST 0.005% EYE DROPS  1 drop, Eye-Both, Daily  metoprolol tart. 25 mg Tab UD  12.5 mg 0.5 tab(s), Oral, BID  MultiVit(THERAGRAN-M Tab) with Minerals Tab  1 tab(s), Oral, Daily  omega-3 polyunsaturated fatty acids 1000mg  1,000 mg 1 cap(s), Oral, BID  vitamin E 400 Int_Unit Cap  400 IntUnit, Oral, Daily  Continuous: (0)  PRN: (1)  Nitroglycerin 0.4 mg TAB (per 25's)  0.4 mg 1 tab(s), SL, q5min   Problem list:    All  Problems  Ankylosing spondylitis / SNOMED CT 02432206 / Confirmed  CAD (coronary artery disease) / ICD-9-.00 / Confirmed  Cyst of kidney / SNOMED CT 2791759211 / Confirmed  Fatty liver / SNOMED CT 7092887118 / Confirmed  Glaucoma / SNOMED CT 82141406 / Confirmed  NO CORTISONE.  Hypercholesterolemia / SNOMED CT 95170951 / Confirmed  Hypertriglyceridemia / SNOMED CT 987212527 / Confirmed  Sleep apnea / SNOMED CT 038202955 / Confirmed      Histories   Past Medical History:    Active  CAD (coronary artery disease) (414.00)  Resolved  hearing loss (41711529):  Resolved.  visual disorder (902133285):  Resolved.  high cholesterol (440357174):  Resolved.  sleep apnea (567832983):  Resolved.  arthritis (3140810):  Resolved.  anxiety (10334363):  Resolved.  depression (88513798):  Resolved.   Family History:    Primary malignant neoplasm of kidney  Brother (Zachary)  Stroke  Mother  Accidental  Brother ()  Coronary artery disease  Father  Mother  Brother ()  Brother (Zachary)  Hypertension.  Brother ()  Brother (Shakeel)  Glaucoma.  Mother     Procedure history:    Colonoscopy on 2017 at 72 Years.  Comments:  2017 10:27 - Alvina PRUITT, Erik Victoria  auto-populated from documented surgical case  History of coronary artery stent placement ([id number]) on 2017 at 72 Years.  History of coronary artery stent placement ([id number]) on 2015 at 70 Years.  Coronary artery bypass, vein only; 2 coronary venous grafts (09244) in  at 47 Years.  Appendectomy; (60756).  Cholecystectomy; (23801).  tonsilectomy.   Social History        Social & Psychosocial Habits    Alcohol  2014 Risk Assessment: Low Risk    2014  Use: Current    Frequency: 1-2 times per month    Exercise  2018  Times per week: 3-4 times/week    Comment: Vanna rodrigues - 2018 14:54 - Clary Vicente    Home/Environment  2018  Lives with: Spouse    2018  Home equipment: CPAP/BiPAP    Substance  Abuse  05/01/2014 Risk Assessment: Denies Substance Abuse      Comment: josé luis - 03/08/2016 16:28 - Valerio PRUITT, Alexa    Tobacco  04/17/2014 Risk Assessment: Denies Tobacco Use      Comment: josé luis - 03/08/2016 16:28 - Alexa Luo RN.        Physical Examination   General:  Alert and oriented, No acute distress.    HENT:  Normocephalic.    Neck:  Supple, Non-tender, No carotid bruit, No jugular venous distention.    Respiratory:  Lungs are clear to auscultation, Respirations are non-labored, Breath sounds are equal, Symmetrical chest wall expansion.    Cardiovascular:  Normal rate, Regular rhythm, No gallop, No edema.    Gastrointestinal:  Soft, Non-tender, Non-distended, Normal bowel sounds.       Vital Signs (last 24 hrs)_____  Last Charted___________  Temp Oral     36.7 DegC  (OCT 18 23:06)  Heart Rate Peripheral   L 58bpm  (OCT 18 23:06)  Resp Rate         18 br/min  (OCT 18 16:17)  SBP      111 mmHg  (OCT 18 23:06)  DBP      72 mmHg  (OCT 18 23:06)  SpO2      95 %  (OCT 18 23:06)  Weight      95 kg  (OCT 18 01:02)  Height      185 cm  (OCT 18 01:02)  BMI      27.76  (OCT 18 01:02)   Integumentary:  Warm.    Neurologic:  Alert, Oriented.    Psychiatric:  Cooperative, Appropriate mood & affect.       Review / Management   Laboratory Results   Today's Lab Results : PowerNote Discrete Results   10/18/2018 17:31 CDT     Troponin-I                0.27 ng/mL    10/18/2018 12:07 CDT     Troponin-I                0.44 ng/mL    10/18/2018 6:08 CDT      Troponin-I                0.45 ng/mL    10/17/2018 20:54 CDT     Troponin I Poc            0.01    10/17/2018 20:40 CDT     POC Troponin              0.01 ng/mL    10/17/2018 20:35 CDT     WBC                       9.4 x10(3)/mcL                             RBC                       4.98 x10(6)/mcL                             Hgb                       15.2 gm/dL                             Hct                       44.7 %                             Platelet                   200 x10(3)/mcL                             MCV                       89.8 fL                             MCH                       30.5 pg                             MCHC                      34.0 gm/dL                             RDW                       12.5 %                             MPV                       9.4 fL                             Abs Neut                  4.03 x10(3)/mcL                             Neutro Auto               43 %  NA                             Lymph Auto                42 %  NA                             Mono Auto                 10 %  NA                             Eos Auto                  3 %  NA                             Abs Eos                   0.3 x10(3)/mcL                             Basophil Auto             1 %  NA                             Abs Neutro                4.03 x10(3)/mcL                             Abs Lymph                 4.0 x10(3)/mcL                             Abs Mono                  0.9 x10(3)/mcL                             Abs Baso                  0.1 x10(3)/mcL                             Sodium Lvl                139 mmol/L                             Potassium Lvl             3.6 mmol/L                             Chloride                  104 mmol/L                             CO2                       29.0 mmol/L                             Calcium Lvl               9.0 mg/dL                             Glucose Lvl               89 mg/dL                             BUN                       15.0 mg/dL                             Creatinine                1.20 mg/dL                             eGFR-AA                   >60 mL/min/1.73 m2  NA                             eGFR-SHAILA                  >60 mL/min/1.73 m2  NA                             Bili Total                0.4 mg/dL                             Bili Direct               0.10 mg/dL                             Bili Indirect             0.30 mg/dL                              AST                       28 unit/L                             ALT                       48 unit/L                             Alk Phos                  92 unit/L                             Total Protein             7.2 gm/dL                             Albumin Lvl               3.70 gm/dL                             Globulin                  3.50 gm/dL                             A/G Ratio                 1.1 ratio                             Troponin-I                <0.02 ng/mL           Impression and Plan   1) Unstable Aagree with ngina  2) CAD  3) HTN  4) HPL  - continue meds  - agree with angiogram.  risks/benefits/alternatives discussed.  pt agree with above

## 2022-04-30 NOTE — H&P
Patient:   Lionel Brown            MRN: [id number]            FIN: [id number]               Age:   72 years     Sex:  Male     :  1945   Associated Diagnoses:   None   Author:   JESSE Ham MD      Chief Complaint   Screening/surveillance colonoscopy.      History of Present Illness   72-year-old individual with a family history of colon cancer, personal history of colon polyps removed 5 years ago who comes in for surveillance.  He has history of coronary disease is on Plavix had stents placed and had an MI 4 months ago.  He's been stable since.  He has noted slight increase in softness and frequency of stooling.  Incidentally his wife also noted the same etiology and change of bowel habits.  He denies any obvious rectal bleeding abdominal pain or change in weight.  He's been on no new medications.  He does drink milk.  He denies any recent travel or any new medications.      Physical Examination      Vital Signs (last 24 hrs)_____  Last Charted___________  Heart Rate Peripheral   L 54bpm  (AUG 23 08:)  Resp Rate         12 br/min  (AUG 23 08:)  SBP      126 mmHg  (AUG 23 08:)  DBP      76 mmHg  (AUG 23 08:)  SpO2      99 %  (AUG 23 08:)     General:  Alert and oriented.    Eye:  Pupils are equal, round and reactive to light.    HENT:  Normal hearing.    Neck:  Supple.    Respiratory:  Lungs are clear to auscultation.    Cardiovascular:  Normal rate, Regular rhythm.       Impression and Plan   Adequate candidate for screening/surveillance colonoscopy  JESSE Ham M.D.

## 2022-04-30 NOTE — H&P
Mr. Brown is a 73-year-old male who was admitted to the hospital through the emergency room for evaluation and treatment of chest pain.  He has a long history of coronary artery disease with his initial bypass surgery being LIMA to the LAD about 25 years ago.  His last admission was April 7, 2017 when he came in with chest pain and had an emergent heart cath done by Dr. Rodriguez, and ended up having a drug-eluting stent placed to the right coronary artery which had a 99% stenosis.  In the interim, he had about 5 stents put in through the years when admitted for chest pain.  I do not recall the arteries that were involved.  Last evening, he had onset of what he thought was reflux esophagitis, but this morning he complained of persistent symptom along with some tightness in his chest and some pain radiating into the left arm.  For that reason, he came to the emergency room.  The electrocardiogram showed no acute changes.  There is poor R-wave progression consistent with possible old anterior infarct, but his troponin enzymes are borderline elevated.  Because of that, I feel that he probably should have another angiogram.  He has requested that Dr. Wei be consulted for this purpose.  The patient otherwise has not had a lot of serious problems.  He has had some upper respiratory problems with rhinitis and bronchitis.  He has had no gastrointestinal problems of any significance.  He has been active physically playing pickleball, and he at times has some problems with fatigue and breathlessness with this activity.  Other than that, he has been found in the past to have a cyst on one kidney.  He at one time was diagnosed as having fatty liver, glaucoma, ankylosing spondylitis, sleep apnea, high triglycerides and high cholesterol in addition to the coronary artery disease.    FAMILY HISTORY:  Noncontributory.    REVIEW OF SYSTEMS:  Otherwise negative.  He had no associated shortness of breath or diaphoresis  with this chest discomfort or arm pain.  No palpitations.    PAST HISTORY:  Otherwise noncontributory.    PHYSICAL EXAMINATION:  GENERAL:  A well developed well-nourished male who at the present time is in no distress.  He is lying in bed flat and is quite comfortable with no shortness of breath.  He has not eaten all day, so he states he is very hungry right now, I think that it probably would be okay for him to eat as I doubt angiogram will be done until Friday morning.     VITAL SIGNS:  Blood pressure is 134/79 with a heart rate of 60, respirations 19.     HEAD, EYES, EARS, NOSE AND THROAT:  No significant abnormalities.     NECK:  Supple.  No masses palpable.     LUNGS:  Clear to auscultation and percussion in all areas.     HEART:  Normal precordial activity.  Heart sounds are normal.  No murmurs heard.     ABDOMEN:  Negative.   GENITALIA AND RECTAL:  Deferred.     EXTREMITIES:  No cyanosis, edema, or clubbing.    FINAL DIAGNOSES:    1. Atherosclerotic heart disease by history.  2. Chest pain, etiology undetermined.    3. Reflux esophagitis.  4. Hearing loss.    5. Sleep apnea.   6. Arthritis.    7. Anxiety and depression.    PLAN:    1. The patient is admitted for further evaluation and treatment.    2. I am consulting Dr. Wei for evaluation and consideration of heart cath in the morning.    MEDICATIONS:  Listed on the chart includes:   1. Plavix 75 mg a day at bed.   2. Co-Q10 150 mg daily.  3. Omega-3 fatty acids 3000 mg a day.  4. Vitamin E 400 mg once a day.  5. Glucosamine which he takes for arthritis.   6. Aspirin 81 mg daily.  7. Vitamin D3 5000 mg twice daily.    8. He is taking turmeric for arthritis pain.  9. Nitrostat as needed.   10. He is on probiotic.    11. He also has been on metoprolol 25 mg 1/2 tablet twice daily.   12. Lexapro 10 mg at bedtime.    13.   Synthroid 25 mcg daily in the morning.  14. Multivitamin 1 daily.        ______________________________  Juan Morse Jr., Jr.,  MD    LD/UM  DD:  10/18/2018  Time:  03:50PM  DT:  10/18/2018  Time:  04:49PM  Job #:  [id number]    The H&P was reviewed, the patient was examined, and the following changes to the patients condition are noted:  ______________________________________________________________________________  ______________________________________________________________________________  ______________________________________________________________________________  [  ] No changes to the patient's condition:      ______________________________                                             ___________________  PHYSICIAN SIGNATURE                                                             DATE/TIME    cc: MD Juan Arroyo Jr., Jr., MD

## 2022-04-30 NOTE — DISCHARGE SUMMARY
Patient:   Lionel Brown            MRN: [id number]            FIN: [id number]               Age:   73 years     Sex:  Male     :  1945   Associated Diagnoses:   Non-ST elevation MI (NSTEMI)   Author:   Izzy HARDWICK, Woody BONILLA      Discharge Information      Discharge Summary Information   Admitted  10/17/2018   Discharged  10/20/2018   Discharge diagnosis     Non-ST elevation MI (NSTEMI) (FTR09-IX I21.4).        Hospital Course   73 year old WM with known CAD, last PCI was of distal RCA in 2017 presented with recurrent anginal symptoms with mildly elevated troponin.  He underwent coronary angiography that showed severe stenosis of a diffusely diseased mid to distal RCA.  He underwent IVUS guided PCI with excellent angiographic results.  He denied recurrent chest discomfort.  Labs stable.  He denied discomfort at right femoral access site.  Extensive education provided re: importance of compliance with dual antiplatelet therapy.  He was reluctant to agree to therapy with high intensity statins despite the aggressive nature of his CAD.  He was informed that Rx for Crestor had been forwarded to his pharmacy.  I advised initiation and further discussion with Dr. Morse as an outpatient.  He was noted to have significant in-stent restenosis of the distal edge of Circ/OM2 stent as well that will be managed medically for now.      Discharge Plan   Discharge Summary Plan   Prescriptions:   aspirin (aspirin 81 mg oral tablet, CHEWABLE) 1 Tablet(s) Oral Daily. Refills: 11.  clopidogrel (Plavix 75 mg oral tablet)   ergocalciferol (Vitamin D 1000 intl units oral tablet) 5000iu Oral 2 times a day. 5000 IU BID.  escitalopram (Lexapro 10 mg oral tablet)   glucosamine (glucosamine hydrochloride 1500 mg oral tablet) 1 Tablet(s) Oral Daily.  lactobacillus acidophilus (Acidophilus oral capsule) 1 Cap Oral Daily.  latanoprost ophthalmic (LATANOPROST 0.005% EYE DROPS) , Recorded by Lina  metoprolol (metoprolol  tartrate 25 mg oral tab) 0.5 Tablet(s) Oral 2 times a day. Refills: 0.  Misc Prescription (Tumeric) 1 capsule Oral Daily.  multivitamin with minerals (multivitamin with minerals (Adult Tab)) 1 Tablet(s) Oral Daily.  nitroglycerin (nitroglycerin 0.4 mg sublingual TAB) 1 Tablet(s) Sublingual every 5 minutes as needed chest pain.  omega-3 polyunsaturated fatty acids (omega-3 polyunsaturated fatty acids oral cap) 1400 mg daily.  red yeast rice (red yeast rice 600 mg oral capsule) 2 Cap Oral 2 times a day.  rosuvastatin (Crestor 20 mg oral tablet) 1 Tablet(s) Oral Daily. Refills: 11.  vitamin E   vitamin E (vitamin E with mixed tocopherols 400 intl units oral capsule) 400 International unit Oral Daily.   .

## 2022-09-04 PROBLEM — Z00.00 MEDICARE ANNUAL WELLNESS VISIT, SUBSEQUENT: Status: ACTIVE | Noted: 2022-09-04

## 2022-09-04 PROBLEM — E78.00 HYPERCHOLESTEREMIA: Status: ACTIVE | Noted: 2022-09-04

## 2022-12-05 PROBLEM — Z00.00 MEDICARE ANNUAL WELLNESS VISIT, SUBSEQUENT: Status: RESOLVED | Noted: 2022-09-04 | Resolved: 2022-12-05

## 2022-12-30 ENCOUNTER — HOSPITAL ENCOUNTER (EMERGENCY)
Facility: HOSPITAL | Age: 77
Discharge: LEFT BEFORE DISCHARGE | End: 2022-12-30
Payer: MEDICARE

## 2022-12-30 VITALS
TEMPERATURE: 100 F | WEIGHT: 218 LBS | SYSTOLIC BLOOD PRESSURE: 114 MMHG | HEIGHT: 73 IN | RESPIRATION RATE: 20 BRPM | DIASTOLIC BLOOD PRESSURE: 74 MMHG | HEART RATE: 86 BPM | BODY MASS INDEX: 28.89 KG/M2 | OXYGEN SATURATION: 98 %

## 2022-12-30 DIAGNOSIS — R06.02 SHORTNESS OF BREATH: ICD-10-CM

## 2022-12-30 LAB
ALBUMIN SERPL-MCNC: 4.1 G/DL (ref 3.4–4.8)
ALBUMIN/GLOB SERPL: 1.6 RATIO (ref 1.1–2)
ALP SERPL-CCNC: 92 UNIT/L (ref 40–150)
ALT SERPL-CCNC: 65 UNIT/L (ref 0–55)
AST SERPL-CCNC: 45 UNIT/L (ref 5–34)
BASOPHILS # BLD AUTO: 0.04 X10(3)/MCL (ref 0–0.2)
BASOPHILS NFR BLD AUTO: 0.5 %
BILIRUBIN DIRECT+TOT PNL SERPL-MCNC: 1 MG/DL
BNP BLD-MCNC: 37 PG/ML
BUN SERPL-MCNC: 10.5 MG/DL (ref 8.4–25.7)
CALCIUM SERPL-MCNC: 9.4 MG/DL (ref 8.8–10)
CHLORIDE SERPL-SCNC: 104 MMOL/L (ref 98–107)
CO2 SERPL-SCNC: 19 MMOL/L (ref 23–31)
CREAT SERPL-MCNC: 1.15 MG/DL (ref 0.73–1.18)
EOSINOPHIL # BLD AUTO: 0.09 X10(3)/MCL (ref 0–0.9)
EOSINOPHIL NFR BLD AUTO: 1.1 %
ERYTHROCYTE [DISTWIDTH] IN BLOOD BY AUTOMATED COUNT: 12.2 % (ref 11.6–14.4)
GFR SERPLBLD CREATININE-BSD FMLA CKD-EPI: >60 MLS/MIN/1.73/M2
GLOBULIN SER-MCNC: 2.6 GM/DL (ref 2.4–3.5)
GLUCOSE SERPL-MCNC: 103 MG/DL (ref 82–115)
HCT VFR BLD AUTO: 44.8 % (ref 42–52)
HGB BLD-MCNC: 15.7 GM/DL (ref 14–18)
IMM GRANULOCYTES # BLD AUTO: 0.04 X10(3)/MCL (ref 0–0.04)
IMM GRANULOCYTES NFR BLD AUTO: 0.5 %
INR BLD: 1.02 (ref 0–1.3)
LYMPHOCYTES # BLD AUTO: 2.04 X10(3)/MCL (ref 0.6–4.6)
LYMPHOCYTES NFR BLD AUTO: 24.6 %
MCH RBC QN AUTO: 31 PG
MCHC RBC AUTO-ENTMCNC: 35 MG/DL (ref 33–36)
MCV RBC AUTO: 88.5 FL (ref 80–94)
MONOCYTES # BLD AUTO: 0.96 X10(3)/MCL (ref 0.1–1.3)
MONOCYTES NFR BLD AUTO: 11.6 %
NEUTROPHILS # BLD AUTO: 5.13 X10(3)/MCL (ref 2.1–9.2)
NEUTROPHILS NFR BLD AUTO: 61.7 %
NRBC BLD AUTO-RTO: 0 % (ref 0–1)
PLATELET # BLD AUTO: 136 X10(3)/MCL (ref 140–371)
PMV BLD AUTO: 9.3 FL (ref 9.4–12.4)
POTASSIUM SERPL-SCNC: 3.9 MMOL/L (ref 3.5–5.1)
PROT SERPL-MCNC: 6.7 GM/DL (ref 5.8–7.6)
PROTHROMBIN TIME: 13.3 SECONDS (ref 12.5–14.5)
RBC # BLD AUTO: 5.06 X10(6)/MCL (ref 4.7–6.1)
SODIUM SERPL-SCNC: 136 MMOL/L (ref 136–145)
TROPONIN I SERPL-MCNC: <0.01 NG/ML (ref 0–0.04)
WBC # SPEC AUTO: 8.3 X10(3)/MCL (ref 4.5–11.5)

## 2022-12-30 PROCEDURE — 83880 ASSAY OF NATRIURETIC PEPTIDE: CPT | Performed by: STUDENT IN AN ORGANIZED HEALTH CARE EDUCATION/TRAINING PROGRAM

## 2022-12-30 PROCEDURE — 80053 COMPREHEN METABOLIC PANEL: CPT | Performed by: STUDENT IN AN ORGANIZED HEALTH CARE EDUCATION/TRAINING PROGRAM

## 2022-12-30 PROCEDURE — 84484 ASSAY OF TROPONIN QUANT: CPT | Performed by: STUDENT IN AN ORGANIZED HEALTH CARE EDUCATION/TRAINING PROGRAM

## 2022-12-30 PROCEDURE — 25000242 PHARM REV CODE 250 ALT 637 W/ HCPCS: Performed by: STUDENT IN AN ORGANIZED HEALTH CARE EDUCATION/TRAINING PROGRAM

## 2022-12-30 PROCEDURE — 85610 PROTHROMBIN TIME: CPT | Performed by: STUDENT IN AN ORGANIZED HEALTH CARE EDUCATION/TRAINING PROGRAM

## 2022-12-30 PROCEDURE — 85025 COMPLETE CBC W/AUTO DIFF WBC: CPT | Performed by: STUDENT IN AN ORGANIZED HEALTH CARE EDUCATION/TRAINING PROGRAM

## 2022-12-30 PROCEDURE — 99900041 HC LEFT WITHOUT BEING SEEN- EMERGENCY

## 2022-12-30 PROCEDURE — 93010 ELECTROCARDIOGRAM REPORT: CPT | Mod: ,,, | Performed by: INTERNAL MEDICINE

## 2022-12-30 PROCEDURE — 93005 ELECTROCARDIOGRAM TRACING: CPT

## 2022-12-30 PROCEDURE — 93010 EKG 12-LEAD: ICD-10-PCS | Mod: ,,, | Performed by: INTERNAL MEDICINE

## 2022-12-30 RX ORDER — IPRATROPIUM BROMIDE AND ALBUTEROL SULFATE 2.5; .5 MG/3ML; MG/3ML
3 SOLUTION RESPIRATORY (INHALATION)
Status: COMPLETED | OUTPATIENT
Start: 2022-12-30 | End: 2022-12-30

## 2022-12-30 RX ADMIN — IPRATROPIUM BROMIDE AND ALBUTEROL SULFATE 3 ML: .5; 3 SOLUTION RESPIRATORY (INHALATION) at 12:12

## 2023-05-01 PROBLEM — J40 BRONCHITIS: Status: ACTIVE | Noted: 2023-05-01

## 2023-08-10 PROBLEM — B35.1 ONYCHOMYCOSIS: Status: ACTIVE | Noted: 2023-08-10

## 2023-10-16 PROBLEM — Z00.00 WELLNESS EXAMINATION: Status: RESOLVED | Noted: 2022-09-04 | Resolved: 2023-10-16

## 2023-11-14 PROBLEM — M54.2 NECK PAIN: Status: ACTIVE | Noted: 2023-11-14

## 2023-11-14 PROBLEM — I25.119 CORONARY ARTERY DISEASE INVOLVING NATIVE CORONARY ARTERY OF NATIVE HEART WITH ANGINA PECTORIS: Status: ACTIVE | Noted: 2023-11-14

## 2023-11-15 PROBLEM — M43.26 ANKYLOSIS OF LUMBAR SPINE: Status: ACTIVE | Noted: 2023-11-15

## 2024-06-11 ENCOUNTER — LAB VISIT (OUTPATIENT)
Dept: LAB | Facility: HOSPITAL | Age: 79
End: 2024-06-11
Attending: INTERNAL MEDICINE
Payer: MEDICARE

## 2024-06-11 DIAGNOSIS — R06.09 DYSPNEA ON EXERTION: ICD-10-CM

## 2024-06-11 DIAGNOSIS — R06.02 SHORTNESS OF BREATH: ICD-10-CM

## 2024-06-11 DIAGNOSIS — I25.118 ATHSCL HEART DISEASE OF NATIVE COR ART W OTH ANG PCTRS: ICD-10-CM

## 2024-06-11 DIAGNOSIS — R53.1 ASTHENIA: Primary | ICD-10-CM

## 2024-06-11 LAB
ALBUMIN SERPL-MCNC: 4 G/DL (ref 3.4–4.8)
ALBUMIN/GLOB SERPL: 1.2 RATIO (ref 1.1–2)
ALP SERPL-CCNC: 121 UNIT/L (ref 40–150)
ALT SERPL-CCNC: 103 UNIT/L (ref 0–55)
ANION GAP SERPL CALC-SCNC: 8 MEQ/L
AST SERPL-CCNC: 73 UNIT/L (ref 5–34)
BASOPHILS # BLD AUTO: 0.08 X10(3)/MCL
BASOPHILS NFR BLD AUTO: 0.8 %
BILIRUB SERPL-MCNC: 0.7 MG/DL
BUN SERPL-MCNC: 13.4 MG/DL (ref 8.4–25.7)
CALCIUM SERPL-MCNC: 10.2 MG/DL (ref 8.8–10)
CHLORIDE SERPL-SCNC: 106 MMOL/L (ref 98–107)
CO2 SERPL-SCNC: 27 MMOL/L (ref 23–31)
CREAT SERPL-MCNC: 1.01 MG/DL (ref 0.73–1.18)
CREAT/UREA NIT SERPL: 13
EOSINOPHIL # BLD AUTO: 0.28 X10(3)/MCL (ref 0–0.9)
EOSINOPHIL NFR BLD AUTO: 3 %
ERYTHROCYTE [DISTWIDTH] IN BLOOD BY AUTOMATED COUNT: 12.6 % (ref 11.5–17)
GFR SERPLBLD CREATININE-BSD FMLA CKD-EPI: >60 ML/MIN/1.73/M2
GLOBULIN SER-MCNC: 3.3 GM/DL (ref 2.4–3.5)
GLUCOSE SERPL-MCNC: 104 MG/DL (ref 82–115)
HCT VFR BLD AUTO: 48.3 % (ref 42–52)
HGB BLD-MCNC: 17 G/DL (ref 14–18)
IMM GRANULOCYTES # BLD AUTO: 0.03 X10(3)/MCL (ref 0–0.04)
IMM GRANULOCYTES NFR BLD AUTO: 0.3 %
LYMPHOCYTES # BLD AUTO: 4.07 X10(3)/MCL (ref 0.6–4.6)
LYMPHOCYTES NFR BLD AUTO: 43 %
MCH RBC QN AUTO: 32.1 PG (ref 27–31)
MCHC RBC AUTO-ENTMCNC: 35.2 G/DL (ref 33–36)
MCV RBC AUTO: 91.1 FL (ref 80–94)
MONOCYTES # BLD AUTO: 0.74 X10(3)/MCL (ref 0.1–1.3)
MONOCYTES NFR BLD AUTO: 7.8 %
NEUTROPHILS # BLD AUTO: 4.27 X10(3)/MCL (ref 2.1–9.2)
NEUTROPHILS NFR BLD AUTO: 45.1 %
NRBC BLD AUTO-RTO: 0 %
PLATELET # BLD AUTO: 208 X10(3)/MCL (ref 130–400)
PMV BLD AUTO: 9.2 FL (ref 7.4–10.4)
POTASSIUM SERPL-SCNC: 4.4 MMOL/L (ref 3.5–5.1)
PROT SERPL-MCNC: 7.3 GM/DL (ref 5.8–7.6)
RBC # BLD AUTO: 5.3 X10(6)/MCL (ref 4.7–6.1)
SODIUM SERPL-SCNC: 141 MMOL/L (ref 136–145)
TSH SERPL-ACNC: 0.9 UIU/ML (ref 0.35–4.94)
WBC # SPEC AUTO: 9.47 X10(3)/MCL (ref 4.5–11.5)

## 2024-06-11 PROCEDURE — 85025 COMPLETE CBC W/AUTO DIFF WBC: CPT

## 2024-06-11 PROCEDURE — 36415 COLL VENOUS BLD VENIPUNCTURE: CPT

## 2024-06-11 PROCEDURE — 84443 ASSAY THYROID STIM HORMONE: CPT

## 2024-06-11 PROCEDURE — 80053 COMPREHEN METABOLIC PANEL: CPT

## 2024-07-11 DIAGNOSIS — Z00.00 WELLNESS EXAMINATION: Primary | ICD-10-CM

## 2024-07-11 DIAGNOSIS — E78.00 HYPERCHOLESTEREMIA: ICD-10-CM

## 2024-07-11 DIAGNOSIS — Z00.00 ENCOUNTER FOR MEDICARE ANNUAL WELLNESS EXAM: ICD-10-CM

## 2024-07-11 DIAGNOSIS — Z12.5 SCREENING FOR PROSTATE CANCER: ICD-10-CM

## 2024-07-17 NOTE — PROGRESS NOTES
..Annual Exam       HPI:     Pt presents for Wellness exam.  He does not sleep well; he goes to bed at 03:00. He naps.   His appetite is normal; he does not eat much.   He is  with 5 children.   He rarely has alcohol.   No tobacco.   He has 2-3 cups of coffee a day. He has soft drinks infrequently.   Cardiologist: Dr Izzy HARDWICK.  He had an angiogram 07/08/2024; three-vessel native CAD with chronic occlusion of ostial LAD, chronic occlusion of small OM1, patent stents extending from proximal circumflex into OM2 and patent RCA with stents extending from proximal to distal segment.  Patient had PET stress test concerning for ischemia and therefore angiogram was done.    Colonoscopy 2 years ago; no repeats; Dr Jitendra HARDWICK.    Urology: Dr Priya HARDWICK. Sees him twice a year. Bph; kidney stones.     The patient's Health Maintenance was reviewed and the following appears to be due at this time:   Health Maintenance Due   Topic Date Due    TETANUS VACCINE  Never done    RSV Vaccine (Age 60+ and Pregnant patients) (1 - 1-dose 60+ series) Never done    Shingles Vaccine (2 of 3) 07/30/2012    COVID-19 Vaccine (5 - 2023-24 season) 09/01/2023       ..  Past Medical History:   Diagnosis Date    Ankylosing spondylitis of unspecified sites in spine     Fatty liver     Sleep apnea, unspecified           ..  Past Surgical History:   Procedure Laterality Date    ANGIOGRAM, AORTIC ARCH, CORONARY  07/08/2024    APPENDECTOMY      CHOLECYSTECTOMY      COLONOSCOPY  08/23/2017    CORONARY ARTERY BYPASS GRAFT  1992    coronary artery stent placement  04/07/2017    coronary artery stent placement  08/20/2015    DENTAL SURGERY      LEFT HEART CATHETERIZATION  10/19/2018    with stent placement    TONSILLECTOMY            Current Outpatient Medications   Medication Instructions    aspirin (ECOTRIN) 81 mg, Oral    brinzolamide (AZOPT) 1 % ophthalmic suspension 1 drop, Both Eyes, 2 times daily    cholecalciferol (vitamin D3) 5,000 Units, Oral     clopidogreL (PLAVIX) 75 mg, Oral, Nightly    coenzyme Q10 150 mg Cap Oral, Daily    furosemide (LASIX) 40 MG tablet     glucosamine-chondroitin 500-400 mg tablet 1 tablet, Oral, Daily    isosorbide mononitrate (IMDUR) 30 mg, Oral    Lactobacillus acidophilus Cap Oral, Daily    latanoprost 0.005 % ophthalmic solution 1 drop, Ophthalmic    levothyroxine (SYNTHROID) 25 mcg, Oral, Daily    metoprolol tartrate (LOPRESSOR) 12.5 mg, Oral, 2 times daily    multivitamin capsule 1 capsule, Oral, Daily    nitroGLYCERIN (NITROSTAT) 0.4 mg, Sublingual, Every 5 min PRN    omega 3-dha-epa-fish oil 910-1,400 mg Cap Oral, Daily    rosuvastatin (CRESTOR) 20 mg, Oral, Daily    tiZANidine (ZANAFLEX) 4 MG tablet TAKE 1 TABLET BY MOUTH EVERY 8 HOURS AS NEEDED FOR SPASM    venlafaxine (EFFEXOR-XR) 150 mg, Oral, Daily         ..  Social History     Socioeconomic History    Marital status:     Number of children: 5   Occupational History    Occupation: retired   Tobacco Use    Smoking status: Former     Types: Cigarettes    Smokeless tobacco: Never   Substance and Sexual Activity    Alcohol use: Not Currently    Drug use: Never     Social Determinants of Health     Financial Resource Strain: Low Risk  (7/19/2024)    Overall Financial Resource Strain (CARDIA)     Difficulty of Paying Living Expenses: Not hard at all   Food Insecurity: No Food Insecurity (7/19/2024)    Hunger Vital Sign     Worried About Running Out of Food in the Last Year: Never true     Ran Out of Food in the Last Year: Never true   Transportation Needs: No Transportation Needs (7/19/2024)    TRANSPORTATION NEEDS     Transportation : No   Physical Activity: Inactive (7/19/2024)    Exercise Vital Sign     Days of Exercise per Week: 0 days     Minutes of Exercise per Session: 0 min   Housing Stability: Low Risk  (7/19/2024)    Housing Stability Vital Sign     Unable to Pay for Housing in the Last Year: No     Homeless in the Last Year: No          ..  Family  History   Problem Relation Name Age of Onset    Coronary artery disease Mother      Glaucoma Mother      Stroke Mother      Coronary artery disease Father      Peripheral vascular disease Father      Coronary artery disease Brother      Hypertension Brother      Kidney cancer Brother      Coronary artery disease Brother      Hypertension Brother            ..  Review of patient's allergies indicates:   Allergen Reactions    Codeine     Cortisone      Other reaction(s): glaucoma          ..  Immunization History   Administered Date(s) Administered    COVID-19 Vaccine 01/26/2021, 02/23/2021    COVID-19, MRNA, LN-S, PF (MODERNA FULL 0.5 ML DOSE) 01/26/2021, 02/23/2021    Influenza - High Dose - PF (65 years and older) 03/14/2019, 11/08/2023    Influenza - Quadrivalent - High Dose - PF (65 years and older) 12/19/2021    Influenza - Trivalent (ADULT) 10/25/2007, 10/01/2019    Influenza - Trivalent - PF (ADULT) 10/25/2007, 11/17/2015, 03/14/2019    Influenza Whole 10/29/2013    Pneumococcal Conjugate - 20 Valent 07/17/2023    Pneumococcal Polysaccharide - 23 Valent 04/17/2014    Zoster 06/04/2012          REVIEW OF SYSTEMS:    GENERAL: No weight loss, no weight gain, no fever, + fatigue, no chills, occasional night sweats  HEENT: No sore throat, no ear pain, no sinus pressure, no nasal congestion, no rhinorrhea, + postnasal drainage, + decreased hearing: aids, no snoring  VISION: No vision changes, no blurry vision, no double vision, + glaucoma, no cataracts, + glasses  LAST EYE EXAM: Dr Hema Townsend, sees him regularly  NECK: no LAD  CARDIAC: No chest pain, no palpitations, + dyspnea on exertion, no orthopnea  RESPIRATORY: No cough, no wheezing, no sputum production, no SOB  GI: No abdominal pain, no N/V, + heartburn, takes TUMS, no constipation, no diarrhea, no blood in stool, (+ ) family history of Colon Ca: brother  : No dysuria, no hematuria, no frequency, no urgency, no incontinence, no testicular  "pain/swelling, ( -) family history of Prostate Ca  MUSC/SKEL: No myalgia, no weakness, no edema, + arthralgias: neck, low back, right wrist, no joint swelling/effusions  SKIN: No rashes, no hives, no itching, no sores  NEURO: No HA, no numbness, no tingling, no weakness, no dizziness, + occasional lightheaded if he gets up too fast  PSYCH: No anxiety, no depression, no irritability, no panic attacks, no s/i, no h/i, no hallucinations  ENDO: No polyuria, no polyphagia, no polydipsia  HEME: No bruising, no bleeding disorders, no signs of anemia.       PHYSICAL EXAM:    ..Visit Vitals  /70   Pulse 88   Temp 98.1 °F (36.7 °C)   Ht 6' 1" (1.854 m)   Wt 101.3 kg (223 lb 4.8 oz)   SpO2 95%   BMI 29.46 kg/m²        General: Well developed, well nourished elderly white male in no apparent distress, alert and oriented x 3.  He is hard of hearing.  He is accompanied by his wife.  Skin: No rash or abnormal lesions  HEENT: Normocephalic, PERRLA, EOMI, mouth WNL, throat WNL, numerous missing teeth, nares normal, EAC and TM WNL bilaterally  Neck: FROM, no LAD, no thyroid abnormalities palpable  Chest: CTA bilaterally, no wheezes crackles or rubs  Cardiac: RRR, no murmurs, rubs, gallops  Abdomen: Soft, nontender, nondistended, NBSx4, no rebound tenderness or guarding, no HSM  Extremities: No clubbing, cyanosis, or edema. Joints WNL, +2 DP/PT pulses bilaterally  Neuro: No sensory or motor defects noted. CN II-XII intact. Gait WNL.  Genital:  Deferred.  Rectal:  Deferred.      1. Medicare annual wellness visit, subsequent  Overview:  CBC, CMP, Lipids, TSH ordered today.   Cardiologist: Dr Izzy HARDWICK.   Colonoscopy 2 months ago; no repeats; Dr Jitendra HARDWICK.    Uro: Dr Priya HARDWICK.     07/19/2024:  Patient presents for wellness examination.  He has been feeling well.    He is status post angiogram 07/08/2024; stable coronary artery disease with patent stents.    Cardio: Dr. Magana.  Urologist: Dr. Powers; BPH, history of kidney stones, " sees him twice year.  Last colonoscopy 2 years ago; no further colonoscopies secondary to age.  Patient with anxiety and depression; these are stable on venlafaxine.  Patient advised to consider a Tdap booster and RSV vaccine; benefits and risks reviewed.  Patient with bilateral sensorineural hearing loss; has a hearing aids which do not work well.    Patient with primary open-angle glaucoma; followed by Dr. Hema Townsend.  Labs pending.  Labs pending.      2. Coronary artery disease involving native coronary artery of native heart with angina pectoris  Overview:  Stable; followed by Dr. Magana.  Patient has chronic dyspnea with exertion since heart attack.    07/19/2024:  Patient had recent angiogram on 07/08/2024 secondary to abnormal PET stress test.  Angiogram revealed stable coronary artery disease/stents.  Followed by Dr. Magana.      3. Hypercholesteremia  Overview:  Well controlled; continue current medication.    Continue low-cholesterol/low-fat diet.    Lipid profile 07/2022 revealed total cholesterol 160, HDL 56, triglycerides 236 and LDL 58.    07/19/2024:  Patient has been compliant with rosuvastatin.    Continue low-cholesterol/low-fat diet.    Lipid profile pending.      4. Fatty liver  Overview:  Labs 06/11/2024:   AST 73.  Limit intake of sugary foods, refined carbohydrates and fatty foods.    Patient encouraged to lose weight.      5. Ankylosis of lumbar spine  Overview:  07/19/2024:  Patient has occasional low back discomfort; he states it is mild and not bothersome.      6. Sensorineural hearing loss (SNHL) of both ears  Overview:  Patient wears hearing aids; he is not wearing them today and is noted to be hard of hearing.    07/19/2024:  Patient has significant hearing loss; he wears hearing aids.  Today he is only wearing the right 1.  He states his hearing aids do not help much.      7. Primary open angle glaucoma (POAG) of both eyes, severe stage  Overview:  07/19/2024: Stable;  followed by Dr. Hema Townsend.      8. Generalized anxiety disorder  Overview:  Stable on current medication.  Patient denies any anxiety attacks.    07/19/2024:  Stable on venlafaxine extended release; refills sent.  Patient denies any panic attacks.    Orders:  -     Discontinue: venlafaxine (EFFEXOR-XR) 150 MG Cp24; Take 1 capsule (150 mg total) by mouth once daily.  Dispense: 90 capsule; Refill: 3  -     venlafaxine (EFFEXOR-XR) 150 MG Cp24; Take 1 capsule (150 mg total) by mouth once daily.  Dispense: 90 capsule; Refill: 3    9. Gastroesophageal reflux disease, unspecified whether esophagitis present  Overview:  07/19/2024: Patient has heartburn and belching on occasion.    He usually takes Tums.  Patient/wife advised to try Pepcid over-the-counter or omeprazole over-the-counter.      10. Degenerative disc disease, cervical  Overview:  Patient presents with neck pain for many years; worse over past 3-4 months.    He denies any injuries accidents or trauma.    He denies any radicular arm pain or upper extremity weakness.    X-rays pending.    Patient may take Tylenol extra-strength or Tylenol arthritis.    07/19/2024:  MRI in November 2023 revealed degenerative disc disease.  Patient evaluated by Dr. Jesus Guajardo.  Patient did a course physical therapy which helped a little.  He reports occasional neck discomfort.      11. Major depressive disorder, recurrent, moderate  Overview:  07/19/2024:   Patient states he has occasional sadness and depression secondary to not being able to do more.  He states his mood is overall doing well.  He denies any harmful or suicidal thoughts.    Continue venlafaxine extended release; refills sent.    Orders:  -     venlafaxine (EFFEXOR-XR) 150 MG Cp24; Take 1 capsule (150 mg total) by mouth once daily.  Dispense: 90 capsule; Refill: 3    12. Aortic atherosclerosis  Overview:  07/19/2024: Stable; followed by Dr. Magana.      13. Benign prostatic hyperplasia with weak urinary  stream  Overview:  Stable; followed by Dr. Powers twice yearly.      14. Depression, major, recurrent, in remission  Overview:  07/19/2024:   Patient states he has occasional sadness and depression secondary to not being able to do more.  He states his mood is overall doing well.  He denies any harmful or suicidal thoughts.    Continue venlafaxine extended release; refills sent.      15. Immunization due  Overview:  07/19/2024: Patient advised to consider a Tdap booster and RSV vaccine; benefits and risks reviewed with patient.  Patient/wife states patient has completed Shingrix series.           ..Follow up in about 1 year (around 7/19/2025) for Wellness.     Future Appointments   Date Time Provider Department Center   7/23/2025  1:00 PM Tomasz Moore MD [facility]

## 2024-07-19 ENCOUNTER — OFFICE VISIT (OUTPATIENT)
Dept: PRIMARY CARE CLINIC | Facility: CLINIC | Age: 79
End: 2024-07-19
Payer: MEDICARE

## 2024-07-19 VITALS
OXYGEN SATURATION: 95 % | HEART RATE: 88 BPM | WEIGHT: 223.31 LBS | DIASTOLIC BLOOD PRESSURE: 70 MMHG | HEIGHT: 73 IN | BODY MASS INDEX: 29.6 KG/M2 | SYSTOLIC BLOOD PRESSURE: 102 MMHG | TEMPERATURE: 98 F

## 2024-07-19 DIAGNOSIS — K21.9 GASTROESOPHAGEAL REFLUX DISEASE, UNSPECIFIED WHETHER ESOPHAGITIS PRESENT: ICD-10-CM

## 2024-07-19 DIAGNOSIS — K76.0 FATTY LIVER: ICD-10-CM

## 2024-07-19 DIAGNOSIS — R39.12 BENIGN PROSTATIC HYPERPLASIA WITH WEAK URINARY STREAM: ICD-10-CM

## 2024-07-19 DIAGNOSIS — I70.0 AORTIC ATHEROSCLEROSIS: ICD-10-CM

## 2024-07-19 DIAGNOSIS — E78.00 HYPERCHOLESTEREMIA: ICD-10-CM

## 2024-07-19 DIAGNOSIS — I25.119 CORONARY ARTERY DISEASE INVOLVING NATIVE CORONARY ARTERY OF NATIVE HEART WITH ANGINA PECTORIS: ICD-10-CM

## 2024-07-19 DIAGNOSIS — M43.26 ANKYLOSIS OF LUMBAR SPINE: ICD-10-CM

## 2024-07-19 DIAGNOSIS — H90.3 SENSORINEURAL HEARING LOSS (SNHL) OF BOTH EARS: ICD-10-CM

## 2024-07-19 DIAGNOSIS — M50.30 DEGENERATIVE DISC DISEASE, CERVICAL: ICD-10-CM

## 2024-07-19 DIAGNOSIS — H40.1133 PRIMARY OPEN ANGLE GLAUCOMA (POAG) OF BOTH EYES, SEVERE STAGE: ICD-10-CM

## 2024-07-19 DIAGNOSIS — Z23 IMMUNIZATION DUE: ICD-10-CM

## 2024-07-19 DIAGNOSIS — N40.1 BENIGN PROSTATIC HYPERPLASIA WITH WEAK URINARY STREAM: ICD-10-CM

## 2024-07-19 DIAGNOSIS — F41.1 GENERALIZED ANXIETY DISORDER: ICD-10-CM

## 2024-07-19 DIAGNOSIS — F33.40 DEPRESSION, MAJOR, RECURRENT, IN REMISSION: ICD-10-CM

## 2024-07-19 DIAGNOSIS — F33.1 MAJOR DEPRESSIVE DISORDER, RECURRENT, MODERATE: ICD-10-CM

## 2024-07-19 DIAGNOSIS — Z00.00 MEDICARE ANNUAL WELLNESS VISIT, SUBSEQUENT: Primary | ICD-10-CM

## 2024-07-19 PROCEDURE — G0439 PPPS, SUBSEQ VISIT: HCPCS | Mod: ,,, | Performed by: FAMILY MEDICINE

## 2024-07-19 RX ORDER — CHOLECALCIFEROL (VITAMIN D3) 125 MCG
5000 CAPSULE ORAL
COMMUNITY

## 2024-07-19 RX ORDER — VENLAFAXINE HYDROCHLORIDE 150 MG/1
150 CAPSULE, EXTENDED RELEASE ORAL DAILY
Qty: 90 CAPSULE | Refills: 3 | Status: SHIPPED | OUTPATIENT
Start: 2024-07-19 | End: 2024-07-19 | Stop reason: SDUPTHER

## 2024-07-19 RX ORDER — FUROSEMIDE 40 MG/1
TABLET ORAL
COMMUNITY
Start: 2024-07-17

## 2024-07-19 RX ORDER — VENLAFAXINE HYDROCHLORIDE 150 MG/1
150 CAPSULE, EXTENDED RELEASE ORAL DAILY
Qty: 90 CAPSULE | Refills: 3 | Status: SHIPPED | OUTPATIENT
Start: 2024-07-19

## 2024-07-19 RX ORDER — TIZANIDINE 4 MG/1
TABLET ORAL
COMMUNITY
Start: 2024-01-25

## 2024-07-23 ENCOUNTER — TELEPHONE (OUTPATIENT)
Dept: PRIMARY CARE CLINIC | Facility: CLINIC | Age: 79
End: 2024-07-23
Payer: MEDICARE

## 2024-07-23 DIAGNOSIS — E03.9 HYPOTHYROIDISM, UNSPECIFIED TYPE: ICD-10-CM

## 2024-07-23 RX ORDER — LEVOTHYROXINE SODIUM 25 UG/1
25 TABLET ORAL DAILY
Qty: 90 TABLET | Refills: 3 | Status: SHIPPED | OUTPATIENT
Start: 2024-07-23

## 2024-07-23 NOTE — TELEPHONE ENCOUNTER
----- Message from Rosalinda Marsh sent at 7/23/2024 10:49 AM CDT -----  Who Called: Lionel Brown    Refill or New Rx:Refill  RX Name and Strength:levothyroxine (SYNTHROID) 25 MCG tablet   How is the patient currently taking it? (ex. 1XDay): 1 xday  Is this a 30 day or 90 day RX:90 tablet  Local or Mail Order: local  List of preferred pharmacies on file (remove unneeded): [unfilled]  If different Pharmacy is requested, enter Pharmacy information here including location and phone number: [id number] [place] [facility] [address] [facility] [address]   Phone: [phone]  Fax: [phone]    Ordering Provider: boris      Preferred Method of Contact: Phone Call  Patient's Preferred Phone Number on File: [phone]   Best Call Back Number, if different:[phone] (manju .. Spouse)  Additional Information:  refill request, please advise, thanks

## 2024-08-06 ENCOUNTER — LAB VISIT (OUTPATIENT)
Dept: LAB | Facility: HOSPITAL | Age: 79
End: 2024-08-06
Attending: FAMILY MEDICINE
Payer: MEDICARE

## 2024-08-06 DIAGNOSIS — Z00.00 ENCOUNTER FOR MEDICARE ANNUAL WELLNESS EXAM: ICD-10-CM

## 2024-08-06 DIAGNOSIS — R74.8 ACID PHOSPHATASE ELEVATED: Primary | ICD-10-CM

## 2024-08-06 DIAGNOSIS — Z00.00 WELLNESS EXAMINATION: ICD-10-CM

## 2024-08-06 DIAGNOSIS — I25.118 ATHSCL HEART DISEASE OF NATIVE COR ART W OTH ANG PCTRS: ICD-10-CM

## 2024-08-06 DIAGNOSIS — Z12.5 SCREENING FOR PROSTATE CANCER: ICD-10-CM

## 2024-08-06 DIAGNOSIS — R06.09 DYSPNEA ON EXERTION: ICD-10-CM

## 2024-08-06 LAB
ALBUMIN SERPL-MCNC: 4.2 G/DL (ref 3.4–4.8)
ALP SERPL-CCNC: 104 UNIT/L (ref 40–150)
ALT SERPL-CCNC: 80 UNIT/L (ref 0–55)
ANION GAP SERPL CALC-SCNC: 8 MEQ/L
AST SERPL-CCNC: 56 UNIT/L (ref 5–34)
BILIRUB DIRECT SERPL-MCNC: 0.3 MG/DL (ref 0–?)
BILIRUB SERPL-MCNC: 0.7 MG/DL
BILIRUBIN DIRECT+TOT PNL SERPL-MCNC: 0.4 MG/DL (ref 0–0.8)
BUN SERPL-MCNC: 16.3 MG/DL (ref 8.4–25.7)
CALCIUM SERPL-MCNC: 10.3 MG/DL (ref 8.8–10)
CHLORIDE SERPL-SCNC: 107 MMOL/L (ref 98–107)
CHOLEST SERPL-MCNC: 148 MG/DL
CHOLEST/HDLC SERPL: 3 {RATIO} (ref 0–5)
CO2 SERPL-SCNC: 26 MMOL/L (ref 23–31)
CREAT SERPL-MCNC: 1.15 MG/DL (ref 0.73–1.18)
CREAT/UREA NIT SERPL: 14
GFR SERPLBLD CREATININE-BSD FMLA CKD-EPI: >60 ML/MIN/1.73/M2
GLUCOSE SERPL-MCNC: 99 MG/DL (ref 82–115)
HDLC SERPL-MCNC: 45 MG/DL (ref 35–60)
LDLC SERPL CALC-MCNC: 54 MG/DL (ref 50–140)
POTASSIUM SERPL-SCNC: 3.7 MMOL/L (ref 3.5–5.1)
PROT SERPL-MCNC: 7.6 GM/DL (ref 5.8–7.6)
PSA SERPL-MCNC: 0.33 NG/ML
SODIUM SERPL-SCNC: 141 MMOL/L (ref 136–145)
TRIGL SERPL-MCNC: 244 MG/DL (ref 34–140)
VLDLC SERPL CALC-MCNC: 49 MG/DL

## 2024-08-06 PROCEDURE — 80061 LIPID PANEL: CPT

## 2024-08-06 PROCEDURE — 36415 COLL VENOUS BLD VENIPUNCTURE: CPT

## 2024-08-06 PROCEDURE — 80076 HEPATIC FUNCTION PANEL: CPT

## 2024-08-06 PROCEDURE — 80048 BASIC METABOLIC PNL TOTAL CA: CPT

## 2024-08-06 PROCEDURE — 84153 ASSAY OF PSA TOTAL: CPT

## 2024-08-14 NOTE — PROGRESS NOTES
Please forward lab results to his cardiologist, Dr. Magana.  Please forward PSA to his urologist, Dr. Powers.

## 2024-10-21 PROBLEM — Z00.00 MEDICARE ANNUAL WELLNESS VISIT, SUBSEQUENT: Status: RESOLVED | Noted: 2022-09-04 | Resolved: 2024-10-21

## 2025-01-02 NOTE — PROGRESS NOTES
Follow-up (ER follow up cold like symptoms/ Negative  flu/covid) and Hip Pain (Right hip pain x 2 days/Declines flu vaccine)       HPI:    Patient presents for hospital ER [id number].  Patient presented to ER on 12/29/2024 with cough and runny nose.  He also reports sinus congestion and pressure.  He denied any fever, chest pain or shortness for breath.  Denied any nausea, vomiting, abdominal pain or diarrhea.  Strep, COVID and flu negative.  Chest x-ray with no acute findings.  Patient was diagnosed with upper respiratory infection and prescribed Tessalon Perles, Flonase and Claritin D.  He is still congested and coughing. He is feeling better.       Current Outpatient Medications   Medication Instructions    aspirin (ECOTRIN) 81 mg    benzonatate (TESSALON) 100 mg, 3 times daily PRN    brinzolamide (AZOPT) 1 % ophthalmic suspension 1 drop, 2 times daily    brinzolamide-brimonidine (SIMBRINZA) 1-0.2 % DrpS 1 drop    cholecalciferol (vitamin D3) 5,000 Units    clopidogreL (PLAVIX) 75 mg, Nightly    coenzyme Q10 150 mg Cap Daily    DOCOSAHEXAENOIC ACID ORAL 1 tablet    dorzolamide (TRUSOPT) 2 % ophthalmic solution 1 drop, 2 times daily    fluticasone propionate (FLONASE) 50 mcg/actuation nasal spray 1 spray    glucosamine-chondroitin 500-400 mg tablet 1 tablet, Daily    hydrocodone-homatropine 5-1.5 mg/5 ml (HYCODAN) 5-1.5 mg/5 mL Syrp 5 mLs, Oral, Every 6 hours PRN    isosorbide mononitrate (IMDUR) 30 mg    latanoprost 0.005 % ophthalmic solution 1 drop    levothyroxine (SYNTHROID) 25 mcg, Oral, Daily    loratadine-pseudoephedrine  mg (CLARITIN-D 24 HOUR)  mg per 24 hr tablet 1 tablet    metoprolol tartrate (LOPRESSOR) 12.5 mg, 2 times daily    multivitamin capsule 1 capsule, Daily    nitroGLYCERIN (NITROSTAT) 0.4 mg, Sublingual, Every 5 min PRN    omega 3-dha-epa-fish oil 910-1,400 mg Cap Daily    rosuvastatin (CRESTOR) 20 mg, Daily    venlafaxine (EFFEXOR-XR) 150 mg, Oral, Daily         ROS:    He  "developed pain in his right hip today. He was walking and he acutely developed pain. He has discomfort with walking. No injuries, accidents or trauma.      PE:    ..Visit Vitals  /73   Pulse 84   Temp 97.8 °F (36.6 °C)   Ht 6' 1" (1.854 m)   Wt 97.2 kg (214 lb 4.8 oz)   SpO2 96%   BMI 28.27 kg/m²        General: He is well-developed well-nourished elderly white male in no apparent distress.  He is alert and oriented.    Ears:  Canals are clear, TMs clear with normal light reflex  Nares:  Edematous mucosa/turbinates with mild erythema  Oropharynx:  Clear.  Chest: Clear to auscultation bilaterally.  CV: Regular rate and rhythm without murmurs rubs gallops  Right hip: Normal in appearance.  No palpable areas of tenderness.  No discomfort with any hip movements.  Patient has good range of motion of hip.      1. Viral upper respiratory tract infection  Assessment & Plan:  Patient diagnosed with upper respiratory infection on 12/29/2024.  He is feeling better.  He still has nasal congestion and a cough.  He does not find Tessalon Perles effective.  Start Hycodan: Take 1 tsp every 6 hours as needed for cough.  Potential side effects and risks discussed with patient.  Continue Flonase and Claritin D.  Rest/fluids.    Call if symptoms persist, worsen or new symptoms develop.    Orders:  -     hydrocodone-homatropine 5-1.5 mg/5 ml (HYCODAN) 5-1.5 mg/5 mL Syrp; Take 5 mLs by mouth every 6 (six) hours as needed (cough).  Dispense: 120 mL; Refill: 0    2. Coronary artery disease involving native coronary artery of native heart with angina pectoris  Overview:  Stable; followed by Dr. Magana.  Patient has chronic dyspnea with exertion since heart attack.    07/19/2024:  Patient had recent angiogram on 07/08/2024 secondary to abnormal PET stress test.  Angiogram revealed stable coronary artery disease/stents.  Followed by Dr. Magana.    Assessment & Plan:  Stable; followed by cardio, Dr. Magana.      3. Aortic " atherosclerosis  Overview:  07/19/2024: Stable; followed by Dr. Magana.    Assessment & Plan:  Stable; followed by cardio, Dr. Magana.  Patient is on rosuvastatin and baby aspirin.      4. Acute right hip pain  Assessment & Plan:  Patient was walking yesterday when he felt a sharp pain in his right hip.    He denies any history of hip problems.    Patient advised to apply heat and take Tylenol as needed.  Exam unremarkable.    Call if symptoms persist, worsen or new symptoms develop.      5. Depression, major, recurrent, in remission  Overview:  07/19/2024:   Patient states he has occasional sadness and depression secondary to not being able to do more.  He states his mood is overall doing well.  He denies any harmful or suicidal thoughts.    Continue venlafaxine extended release; refills sent.    Assessment & Plan:  His depression is stable; continue sertraline.    He denies sadness, depression, crying spells or suicidal thoughts.  ER precautions given.      6. Immunization due  Overview:  07/19/2024: Patient advised to consider a Tdap booster and RSV vaccine; benefits and risks reviewed with patient.  Patient/wife states patient has completed Shingrix series.    Assessment & Plan:  Patient advised to get a Tdap booster and RSV vaccine; benefits, side effects and risks discussed with patient.                ..Follow up if symptoms worsen or fail to improve.       Future Appointments   Date Time Provider Department Center   7/23/2025  1:00 PM Tomasz Moore MD [facility]

## 2025-01-03 ENCOUNTER — OFFICE VISIT (OUTPATIENT)
Dept: PRIMARY CARE CLINIC | Facility: CLINIC | Age: 80
End: 2025-01-03
Payer: MEDICARE

## 2025-01-03 VITALS
OXYGEN SATURATION: 96 % | BODY MASS INDEX: 28.4 KG/M2 | WEIGHT: 214.31 LBS | DIASTOLIC BLOOD PRESSURE: 73 MMHG | HEART RATE: 84 BPM | SYSTOLIC BLOOD PRESSURE: 119 MMHG | TEMPERATURE: 98 F | HEIGHT: 73 IN

## 2025-01-03 DIAGNOSIS — J06.9 VIRAL UPPER RESPIRATORY TRACT INFECTION: Primary | ICD-10-CM

## 2025-01-03 DIAGNOSIS — M25.551 ACUTE RIGHT HIP PAIN: ICD-10-CM

## 2025-01-03 DIAGNOSIS — F33.40 DEPRESSION, MAJOR, RECURRENT, IN REMISSION: ICD-10-CM

## 2025-01-03 DIAGNOSIS — I25.119 CORONARY ARTERY DISEASE INVOLVING NATIVE CORONARY ARTERY OF NATIVE HEART WITH ANGINA PECTORIS: ICD-10-CM

## 2025-01-03 DIAGNOSIS — I70.0 AORTIC ATHEROSCLEROSIS: ICD-10-CM

## 2025-01-03 DIAGNOSIS — Z23 IMMUNIZATION DUE: ICD-10-CM

## 2025-01-03 PROBLEM — F33.1 MAJOR DEPRESSIVE DISORDER, RECURRENT, MODERATE: Status: RESOLVED | Noted: 2025-01-03 | Resolved: 2025-01-03

## 2025-01-03 PROBLEM — Z28.21 IMMUNIZATION DECLINED: Status: ACTIVE | Noted: 2025-01-03

## 2025-01-03 PROBLEM — F33.1 MAJOR DEPRESSIVE DISORDER, RECURRENT, MODERATE: Status: ACTIVE | Noted: 2025-01-03

## 2025-01-03 PROBLEM — E11.9 TYPE 2 DIABETES MELLITUS WITHOUT COMPLICATION, WITHOUT LONG-TERM CURRENT USE OF INSULIN: Status: ACTIVE | Noted: 2025-01-03

## 2025-01-03 RX ORDER — DORZOLAMIDE HCL 20 MG/ML
1 SOLUTION/ DROPS OPHTHALMIC 2 TIMES DAILY
COMMUNITY
Start: 2024-08-13

## 2025-01-03 RX ORDER — HYDROCODONE BITARTRATE AND HOMATROPINE METHYLBROMIDE ORAL SOLUTION 5; 1.5 MG/5ML; MG/5ML
5 LIQUID ORAL EVERY 6 HOURS PRN
Qty: 120 ML | Refills: 0 | Status: SHIPPED | OUTPATIENT
Start: 2025-01-03

## 2025-01-03 RX ORDER — METOPROLOL SUCCINATE 25 MG/1
12.5 TABLET, EXTENDED RELEASE ORAL
COMMUNITY
End: 2025-01-03

## 2025-01-03 RX ORDER — BRINZOLAMIDE/BRIMONIDINE TARTRATE 10; 2 MG/ML; MG/ML
1 SUSPENSION/ DROPS OPHTHALMIC
COMMUNITY

## 2025-01-03 RX ORDER — BENZONATATE 100 MG/1
100 CAPSULE ORAL 3 TIMES DAILY PRN
COMMUNITY
Start: 2024-12-29 | End: 2025-01-08

## 2025-01-03 RX ORDER — FLUTICASONE PROPIONATE 50 MCG
1 SPRAY, SUSPENSION (ML) NASAL
COMMUNITY
Start: 2024-12-29 | End: 2025-01-12

## 2025-01-03 RX ORDER — LORATADINE PSEUDOEPHEDRINE SULFATE 10; 240 MG/1; MG/1
1 TABLET, EXTENDED RELEASE ORAL
COMMUNITY
Start: 2024-12-29 | End: 2025-01-12

## 2025-01-03 NOTE — ASSESSMENT & PLAN NOTE
Patient was walking yesterday when he felt a sharp pain in his right hip.    He denies any history of hip problems.    Patient advised to apply heat and take Tylenol as needed.  Exam unremarkable.    Call if symptoms persist, worsen or new symptoms develop.

## 2025-01-03 NOTE — ASSESSMENT & PLAN NOTE
Patient advised to get a Tdap booster and RSV vaccine; benefits, side effects and risks discussed with patient.

## 2025-01-03 NOTE — ASSESSMENT & PLAN NOTE
His depression is stable; continue sertraline.    He denies sadness, depression, crying spells or suicidal thoughts.  ER precautions given.

## 2025-01-03 NOTE — ASSESSMENT & PLAN NOTE
Patient diagnosed with upper respiratory infection on 12/29/2024.  He is feeling better.  He still has nasal congestion and a cough.  He does not find Tessalon Perles effective.  Start Hycodan: Take 1 tsp every 6 hours as needed for cough.  Potential side effects and risks discussed with patient.  Continue Flonase and Claritin D.  Rest/fluids.    Call if symptoms persist, worsen or new symptoms develop.

## 2025-07-15 DIAGNOSIS — K76.0 FATTY LIVER: ICD-10-CM

## 2025-07-15 DIAGNOSIS — Z00.00 MEDICARE ANNUAL WELLNESS VISIT, SUBSEQUENT: Primary | ICD-10-CM

## 2025-07-15 DIAGNOSIS — N40.1 BENIGN PROSTATIC HYPERPLASIA WITH WEAK URINARY STREAM: ICD-10-CM

## 2025-07-15 DIAGNOSIS — I25.119 CORONARY ARTERY DISEASE INVOLVING NATIVE CORONARY ARTERY OF NATIVE HEART WITH ANGINA PECTORIS: ICD-10-CM

## 2025-07-15 DIAGNOSIS — E78.00 HYPERCHOLESTEREMIA: ICD-10-CM

## 2025-07-15 DIAGNOSIS — R39.12 BENIGN PROSTATIC HYPERPLASIA WITH WEAK URINARY STREAM: ICD-10-CM

## 2025-07-15 DIAGNOSIS — Z12.5 SCREENING PSA (PROSTATE SPECIFIC ANTIGEN): ICD-10-CM

## 2025-07-17 NOTE — PROGRESS NOTES
..Annual Exam       HPI:     Lionel Brown is a 80 y.o. male here today for a Medicare Wellness.       Opioid Screening: Patient medication list reviewed, patient is not taking prescription opioids. Patient is not using additional opioids than prescribed. Patient is at low risk of substance abuse based on this opioid use history.     He does not sleep well; he goes to bed at 12:00-01:00. He wakes up early some times. He gets up and reads. He takes naps.  His appetite is good.   He is  with 5 children.   He rarely has alcohol.   No tobacco.   He has 2-3 cups of coffee a day. He has soft drinks every other day; sugar free Dr Pepper.  Cardiologist: Dr Izzy HARDWICK; last saw him 4-5 mo ago.  Colonoscopy 2 years ago; no repeats; Dr Jitendra HARDWICK.    Urology: Dr Priya HARDWICK. Sees him twice a year, saw him a few months ago.  BPH; kidney stones.    The patient's Health Maintenance was reviewed and the following appears to be due at this time:   Health Maintenance Due   Topic Date Due    TETANUS VACCINE  Never done    Shingles Vaccine (2 of 3) 07/30/2012    RSV Vaccine (Age 60+ and Pregnant patients) (1 - 1-dose 75+ series) Never done    COVID-19 Vaccine (5 - 2024-25 season) 09/01/2024       ..  Past Medical History:   Diagnosis Date    Ankylosing spondylitis of unspecified sites in spine     Fatty liver     Sleep apnea, unspecified           ..  Past Surgical History:   Procedure Laterality Date    ANGIOGRAM, AORTIC ARCH, CORONARY  07/08/2024    APPENDECTOMY      CHOLECYSTECTOMY      COLONOSCOPY  08/23/2017    CORONARY ARTERY BYPASS GRAFT  1992    coronary artery stent placement  04/07/2017    coronary artery stent placement  08/20/2015    DENTAL SURGERY      LEFT HEART CATHETERIZATION  10/19/2018    with stent placement    TONSILLECTOMY            Current Outpatient Medications   Medication Instructions    aspirin (ECOTRIN) 81 mg    brinzolamide (AZOPT) 1 % ophthalmic suspension 1 drop, 2 times daily     brinzolamide-brimonidine (SIMBRINZA) 1-0.2 % DrpS 1 drop    cholecalciferol (vitamin D3) 5,000 Units    clopidogreL (PLAVIX) 75 mg, Nightly    coenzyme Q10 150 mg Cap Daily    dorzolamide (TRUSOPT) 2 % ophthalmic solution 1 drop, 2 times daily    glucosamine-chondroitin 500-400 mg tablet 1 tablet, Daily    isosorbide mononitrate (IMDUR) 30 mg    levothyroxine (SYNTHROID) 25 mcg, Oral, Daily    metoprolol tartrate (LOPRESSOR) 12.5 mg, 2 times daily    multivitamin capsule 1 capsule, Daily    nitroGLYCERIN (NITROSTAT) 0.4 mg, Sublingual, Every 5 min PRN    omega 3-dha-epa-fish oil 910-1,400 mg Cap Daily    rosuvastatin (CRESTOR) 20 mg, Daily    venlafaxine (EFFEXOR-XR) 150 mg, Oral, Daily         ..Social History[1]       ..  Family History   Problem Relation Name Age of Onset    Coronary artery disease Mother      Glaucoma Mother      Stroke Mother      Coronary artery disease Father      Peripheral vascular disease Father      Coronary artery disease Brother      Hypertension Brother      Kidney cancer Brother      Coronary artery disease Brother      Hypertension Brother            ..  Review of patient's allergies indicates:   Allergen Reactions    Codeine     Cortisone      Other reaction(s): glaucoma          ..  Immunization History   Administered Date(s) Administered    COVID-19 Vaccine 01/26/2021, 02/23/2021    COVID-19, MRNA, LN-S, PF (MODERNA FULL 0.5 ML DOSE) 01/26/2021, 02/23/2021    Influenza - Quadrivalent - High Dose - PF (65 years and older) 12/19/2021    Influenza - Trivalent - Afluria, Fluzone MDV 10/25/2007, 10/01/2019    Influenza - Trivalent - Fluarix, Flulaval, Fluzone, Afluria - PF 10/25/2007, 11/17/2015, 03/14/2019    Influenza - Trivalent - Fluzone High Dose - PF (65 years and older) 03/14/2019, 11/08/2023    Influenza Whole 10/29/2013    Pneumococcal Conjugate - 20 Valent 07/17/2023    Pneumococcal Polysaccharide - 23 Valent 04/17/2014    Zoster 06/04/2012          REVIEW OF  "SYSTEMS:    GENERAL: No weight loss, no weight gain, no fever, no fatigue, no chills, no night sweats  HEENT: No sore throat, no ear pain, no sinus pressure, + nasal congestion, no rhinorrhea, + decreased hearing: does not wear them much, no snoring  VISION: No vision changes, no blurry vision, no double vision, + glaucoma, no cataracts, + reading glasses  LAST EYE EXAM: Dr Hema Townsend, sees him every 6 months  NECK: no LAD  CARDIAC: No chest pain, no palpitations, + dyspnea on exertion, no orthopnea  RESPIRATORY: occasional cough in am, no wheezing, no sputum production, no SOB  GI: No abdominal pain, no N/V, occasional heartburn, takes TUMS regularly, no constipation, no diarrhea, no blood in stool, (+ ) family history of Colon Ca: brother  : No dysuria, no hematuria, no frequency, no urgency, no incontinence, no testicular pain/swelling, (- ) family history of Prostate Ca  MUSC/SKEL: No myalgia, no weakness, no edema, no arthralgia, no joint swelling/effusions  SKIN: No rashes, no hives, no itching, no sores  NEURO: No HA, no numbness, no tingling, no weakness, no dizziness, + lightheadedness if he gets up too fast  PSYCH: No anxiety, no depression, no irritability, no panic attacks, no s/i, no h/i, no hallucinations  ENDO: No polyuria, no polyphagia, no polydipsia  HEME: No bruising, no bleeding disorders, no signs of anemia.       PHYSICAL EXAM:    ..Visit Vitals  /73   Pulse 80   Temp 98.2 °F (36.8 °C)   Ht 6' 1" (1.854 m)   Wt 98 kg (216 lb)   SpO2 95%   BMI 28.50 kg/m²        General: Well developed, well nourished elderly white male in no apparent distress, alert and oriented x 3.  He is hard of hearing  Skin: No rash or abnormal lesions  HEENT: Normocephalic, PERRLA, EOMI, mouth WNL, throat WNL, nares normal, EAC and TM WNL bilaterally  Neck: FROM, no LAD, no thyroid abnormalities palpable  Chest: CTA bilaterally, no wheezes crackles or rubs  Cardiac: RRR, no murmurs, rubs, gallops  Abdomen: " Soft, nontender, nondistended, NBSx4, no rebound tenderness or guarding, no HSM  Extremities: No clubbing, cyanosis, or edema. Joints WNL, +2 DP/PT pulses bilaterally  Neuro: No sensory or motor defects noted. CN II-XII intact. Gait WNL.  Genital:  Deferred  Rectal:  Deferred    A comprehensive HEALTH RISK ASSESSMENT was completed today. Results are summarized below:    There are NO EMOTIONAL/SOCIAL CONCERNS identified on today's screening for Social Isolation, Depression and Anxiety.    The following COGNITIVE FUNCTION CONCERNS were identified on today's screening:  *Patient reports others comment he FREQUENTLY REPEATS THINGS. (Do others tell you that you repeat questions/statements in the same day?: (!) Yes)  *Patient reports he FREQUENTLY HAS TROUBLE REMEMBERING. (Do you have trouble remembering the date, year, and time?: (!) Yes)  The following FUNCTIONAL AND/OR SAFETY CONCERNS were identified on today's screening for Physical Symptoms, Nutritional, Home Safety/Living Situation, Fall Risk, Activities of Daily Living, Independent Activities of Daily Living, Physical Activity,Timed Up and Go test and Whisper test::  *Patient reports NO ROUTINE EXERCISE. (On average, how many days per week do you engage in moderate to strenuous exercise (like a brisk walk)?: (!) 0)  *Patient reports PHYSICAL ACTIVITY LIMITED BY PAIN/FATIGUE. (In the past four weeks have you your activities been limited by pain, tiredness or fatigue?: (!) Yes (SOB))  *Patient reports he NEEDS ASSISTANCE WITH SOME ACTIVITIES OF DAILY LIVING. (Do you need the help of another person with your personal care needs such as eating, bathing, dressing, or getting around the house?: (!) Yes (WIFE))     The patient reports NO OPIOID PRESCRIPTIONS. This was confirmed through medication reconciliation.    The patient is NOT A TOBACCO USER.  The patient reports NO SIGNIFICANT ALCOHOL USE.     All Questions regarding food, transportation or housing were not  answered today.    1. Medicare annual wellness visit, subsequent  Overview:  CBC, CMP, Lipids, TSH ordered today.   Cardiologist: Dr Izzy HARDWICK.   Colonoscopy 2 months ago; no repeats; Dr Jitendra HARDWICK.    Uro: Dr Priya HARDWICK.     07/19/2024:  Patient presents for wellness examination.  He has been feeling well.    He is status post angiogram 07/08/2024; stable coronary artery disease with patent stents.    Cardio: Dr. Magana.  Urologist: Dr. Powers; BPH, history of kidney stones, sees him twice year.  Last colonoscopy 2 years ago; no further colonoscopies secondary to age.  Patient with anxiety and depression; these are stable on venlafaxine.  Patient advised to consider a Tdap booster and RSV vaccine; benefits and risks reviewed.  Patient with bilateral sensorineural hearing loss; has a hearing aids which do not work well.    Patient with primary open-angle glaucoma; followed by Dr. Hema Townsend.  Labs pending.  Labs pending.    Assessment & Plan:  07/23/2025:  Patient presents for wellness examination.    He has been feeling well.  Cardio: Dr. Magana; coronary artery disease.  Urologist: Dr. Pwoers; BPH, history of kidney stones, sees him twice year.  Last colonoscopy 2 years ago; no further colonoscopies secondary to age.  Patient with anxiety and depression; these are stable on venlafaxine.  Patient advised to consider a Tdap booster and RSV vaccine; benefits and risks reviewed.  Patient with bilateral sensorineural hearing loss; has a hearing aids which do not work well.   Patient with primary open-angle glaucoma; followed by Dr. Hema Townsend.   Labs reviewed.          2. Coronary artery disease involving native coronary artery of native heart with angina pectoris  Overview:  Stable; followed by Dr. Magana.  Patient has chronic dyspnea with exertion since heart attack.    07/19/2024:  Patient had recent angiogram on 07/08/2024 secondary to abnormal PET stress test.  Angiogram revealed stable coronary artery  disease/stents.  Followed by Dr. Magana.    Assessment & Plan:  Stable; followed by Dr. Magana.      3. Aortic atherosclerosis  Overview:  07/19/2024: Stable; followed by Dr. Magana.    Assessment & Plan:  Stable; followed by Dr. Magana.      4. Mixed hyperlipidemia  Overview:  Well controlled; continue current medication.    Continue low-cholesterol/low-fat diet.    Lipid profile 07/2022 revealed total cholesterol 160, HDL 56, triglycerides 236 and LDL 58.    07/19/2024:  Patient has been compliant with rosuvastatin.    Continue low-cholesterol/low-fat diet.    Lipid profile pending.    Assessment & Plan:  Lipid profile: Total cholesterol 168, HDL 44, triglycerides 319 and LDL 60.  Patient advised to limit intake of sugary foods, refined carbohydrates and fatty foods.  Patient encouraged to lose weight.  Continue rosuvastatin.      5. Benign prostatic hyperplasia with weak urinary stream  Overview:  Stable; followed by Dr. Powers twice yearly.    Assessment & Plan:  Stable; followed by Dr. Powers twice yearly.       6. Screening PSA (prostate specific antigen)  Overview:  07/23/2025: PSA 0.33; forward results to Dr. Powers.      7. Fatty liver  Overview:  Labs 06/11/2024:   AST 73.  Limit intake of sugary foods, refined carbohydrates and fatty foods.    Patient encouraged to lose weight.    Assessment & Plan:  His liver enzymes are normal.  Limit intake of sugary foods, refined carbohydrates and fatty foods.    Patient encouraged to lose weight.      8. Gastroesophageal reflux disease, unspecified whether esophagitis present  Overview:  07/19/2024: Patient has heartburn and belching on occasion.    He usually takes Tums.  Patient/wife advised to try Pepcid over-the-counter or omeprazole over-the-counter.    Assessment & Plan:  Patient has a occasional reflux; he takes Tums as needed.      9. Degenerative disc disease, cervical  Overview:  Patient presents with neck pain for many years; worse over past 3-4 months.     He denies any injuries accidents or trauma.    He denies any radicular arm pain or upper extremity weakness.    X-rays pending.    Patient may take Tylenol extra-strength or Tylenol arthritis.    07/19/2024:  MRI in November 2023 revealed degenerative disc disease.  Patient evaluated by Dr. Jesus Guajardo.  Patient did a course physical therapy which helped a little.  He reports occasional neck discomfort.    Assessment & Plan:  Patient states his neck bothers him at times; he takes Tylenol as needed.      10. Depression, major, recurrent, in remission  Overview:  07/19/2024:   Patient states he has occasional sadness and depression secondary to not being able to do more.  He states his mood is overall doing well.  He denies any harmful or suicidal thoughts.    Continue venlafaxine extended release; refills sent.    Assessment & Plan:  Stable; see overview.      11. Generalized anxiety disorder  Overview:  Stable on current medication.  Patient denies any anxiety attacks.    07/19/2024:  Stable on venlafaxine extended release; refills sent.  Patient denies any panic attacks.    Assessment & Plan:  Stable on venlafaxine extended release; refills sent. Patient denies any panic attacks.      12. Sensorineural hearing loss (SNHL) of both ears  Overview:  Patient wears hearing aids; he is not wearing them today and is noted to be hard of hearing.    07/19/2024:  Patient has significant hearing loss; he wears hearing aids.  Today he is only wearing the right 1.  He states his hearing aids do not help much.    Assessment & Plan:  Patient has hearing aids but he does not wear them often.      13. Primary open angle glaucoma (POAG) of both eyes, severe stage  Overview:  07/19/2024: Stable; followed by Dr. Hema Townsend.    Assessment & Plan:  Stable; followed by Dr. Hema Townsend.      14. Overweight with body mass index (BMI) of 28 to 28.9 in adult  Overview:  Patient encouraged to make healthy food choices and limit  portions.    Patient encouraged to limit intake of fried foods, processed foods and sugary foods.    Patient encouraged to increase physical activity, exercise regularly and lose weight.       15. Glaucoma of both eyes, unspecified glaucoma type    16. Major depressive disorder, recurrent, moderate    17. Immunization due  Overview:  07/19/2024: Patient advised to consider a Tdap booster and RSV vaccine; benefits and risks reviewed with patient.  Patient/wife states patient has completed Shingrix series.    Assessment & Plan:  Patient advised to consider a Tdap booster and RSV vaccine; benefits and risks reviewed with patient .           Medicare Annual Wellness and Personalized Prevention Plan:   Fall Risk + Home Safety + Hearing Impairment + Depression Screen + Opioid and Substance Abuse Screening + Cognitive Impairment Screen + Health Risk Assessment all reviewed.     Advance Care Planning   I attest to discussing Advance Care Planning with patient and/or family member.  Education was provided including the importance of the Health Care Power of , Advance Directives, and/or [place] documentation.  The patient expressed understanding to the importance of this information and discussion.           ..Follow up in about 1 year (around 7/23/2026) for Wellness, With labwork prior to visit.     Future Appointments   Date Time Provider Department Center   7/28/2026  1:00 PM Tomasz Moore MD [facility]            [1]   Social History  Socioeconomic History    Marital status:     Number of children: 5   Occupational History    Occupation: retired   Tobacco Use    Smoking status: Former     Types: Cigarettes     Passive exposure: Past    Smokeless tobacco: Never   Substance and Sexual Activity    Alcohol use: Not Currently    Drug use: Never     Social Drivers of Health     Financial Resource Strain: Low Risk  (7/23/2025)    Overall Financial Resource Strain (CARDIA)     Difficulty of Paying  Living Expenses: Not hard at all   Food Insecurity: No Food Insecurity (7/23/2025)    Hunger Vital Sign     Worried About Running Out of Food in the Last Year: Never true     Ran Out of Food in the Last Year: Never true   Transportation Needs: No Transportation Needs (7/23/2025)    PRAPARE - Transportation     Lack of Transportation (Medical): No     Lack of Transportation (Non-Medical): No   Physical Activity: Inactive (7/23/2025)    Exercise Vital Sign     Days of Exercise per Week: 0 days     Minutes of Exercise per Session: 0 min   Stress: No Stress Concern Present (7/23/2025)    [employer] [place] [employer] - Occupational Stress Questionnaire     Feeling of Stress : Not at all   Housing Stability: Low Risk  (7/23/2025)    Housing Stability Vital Sign     Unable to Pay for Housing in the Last Year: No     Homeless in the Last Year: No

## 2025-07-22 ENCOUNTER — LAB VISIT (OUTPATIENT)
Dept: LAB | Facility: HOSPITAL | Age: 80
End: 2025-07-22
Attending: FAMILY MEDICINE
Payer: MEDICARE

## 2025-07-22 DIAGNOSIS — N40.1 BENIGN PROSTATIC HYPERPLASIA WITH WEAK URINARY STREAM: ICD-10-CM

## 2025-07-22 DIAGNOSIS — K76.0 FATTY LIVER: ICD-10-CM

## 2025-07-22 DIAGNOSIS — E78.00 HYPERCHOLESTEREMIA: ICD-10-CM

## 2025-07-22 DIAGNOSIS — R39.12 BENIGN PROSTATIC HYPERPLASIA WITH WEAK URINARY STREAM: ICD-10-CM

## 2025-07-22 DIAGNOSIS — Z12.5 SCREENING PSA (PROSTATE SPECIFIC ANTIGEN): ICD-10-CM

## 2025-07-22 DIAGNOSIS — I25.119 CORONARY ARTERY DISEASE INVOLVING NATIVE CORONARY ARTERY OF NATIVE HEART WITH ANGINA PECTORIS: ICD-10-CM

## 2025-07-22 LAB
ALBUMIN SERPL-MCNC: 4.2 G/DL (ref 3.4–4.8)
ALBUMIN/GLOB SERPL: 1.1 RATIO (ref 1.1–2)
ALP SERPL-CCNC: 101 UNIT/L (ref 40–150)
ALT SERPL-CCNC: 55 UNIT/L (ref 0–55)
ANION GAP SERPL CALC-SCNC: 9 MEQ/L
AST SERPL-CCNC: 38 UNIT/L (ref 11–45)
BASOPHILS # BLD AUTO: 0.08 X10(3)/MCL
BASOPHILS NFR BLD AUTO: 0.9 %
BILIRUB SERPL-MCNC: 0.9 MG/DL
BILIRUB UR QL STRIP.AUTO: NEGATIVE
BUN SERPL-MCNC: 18.7 MG/DL (ref 8.4–25.7)
CALCIUM SERPL-MCNC: 9.8 MG/DL (ref 8.8–10)
CHLORIDE SERPL-SCNC: 106 MMOL/L (ref 98–107)
CHOLEST SERPL-MCNC: 168 MG/DL
CHOLEST/HDLC SERPL: 4 {RATIO} (ref 0–5)
CLARITY UR: CLEAR
CO2 SERPL-SCNC: 26 MMOL/L (ref 23–31)
COLOR UR AUTO: YELLOW
CREAT SERPL-MCNC: 1.23 MG/DL (ref 0.72–1.25)
CREAT/UREA NIT SERPL: 15
EOSINOPHIL # BLD AUTO: 0.28 X10(3)/MCL (ref 0–0.9)
EOSINOPHIL NFR BLD AUTO: 3 %
ERYTHROCYTE [DISTWIDTH] IN BLOOD BY AUTOMATED COUNT: 11.9 % (ref 11.5–17)
GFR SERPLBLD CREATININE-BSD FMLA CKD-EPI: 59 ML/MIN/1.73/M2
GLOBULIN SER-MCNC: 4 GM/DL (ref 2.4–3.5)
GLUCOSE SERPL-MCNC: 98 MG/DL (ref 82–115)
GLUCOSE UR QL STRIP: NEGATIVE
HCT VFR BLD AUTO: 50.4 % (ref 42–52)
HDLC SERPL-MCNC: 44 MG/DL (ref 35–60)
HGB BLD-MCNC: 17.6 G/DL (ref 14–18)
HGB UR QL STRIP: NEGATIVE
IMM GRANULOCYTES # BLD AUTO: 0.04 X10(3)/MCL (ref 0–0.04)
IMM GRANULOCYTES NFR BLD AUTO: 0.4 %
KETONES UR QL STRIP: NEGATIVE
LDLC SERPL CALC-MCNC: 60 MG/DL (ref 50–140)
LEUKOCYTE ESTERASE UR QL STRIP: NEGATIVE
LYMPHOCYTES # BLD AUTO: 3.54 X10(3)/MCL (ref 0.6–4.6)
LYMPHOCYTES NFR BLD AUTO: 38 %
MCH RBC QN AUTO: 31 PG (ref 27–31)
MCHC RBC AUTO-ENTMCNC: 34.9 G/DL (ref 33–36)
MCV RBC AUTO: 88.7 FL (ref 80–94)
MONOCYTES # BLD AUTO: 0.78 X10(3)/MCL (ref 0.1–1.3)
MONOCYTES NFR BLD AUTO: 8.4 %
NEUTROPHILS # BLD AUTO: 4.6 X10(3)/MCL (ref 2.1–9.2)
NEUTROPHILS NFR BLD AUTO: 49.3 %
NITRITE UR QL STRIP: NEGATIVE
NRBC BLD AUTO-RTO: 0 %
PH UR STRIP: 6 [PH]
PLATELET # BLD AUTO: 201 X10(3)/MCL (ref 130–400)
PMV BLD AUTO: 9.1 FL (ref 7.4–10.4)
POTASSIUM SERPL-SCNC: 4.3 MMOL/L (ref 3.5–5.1)
PROT SERPL-MCNC: 8.2 GM/DL (ref 5.8–7.6)
PROT UR QL STRIP: NEGATIVE
PSA SERPL-MCNC: 0.33 NG/ML
RBC # BLD AUTO: 5.68 X10(6)/MCL (ref 4.7–6.1)
SODIUM SERPL-SCNC: 141 MMOL/L (ref 136–145)
SP GR UR STRIP.AUTO: 1.01 (ref 1–1.03)
TRIGL SERPL-MCNC: 319 MG/DL (ref 34–140)
UROBILINOGEN UR STRIP-ACNC: 0.2
VLDLC SERPL CALC-MCNC: 64 MG/DL
WBC # BLD AUTO: 9.32 X10(3)/MCL (ref 4.5–11.5)

## 2025-07-22 PROCEDURE — 80061 LIPID PANEL: CPT

## 2025-07-22 PROCEDURE — 84153 ASSAY OF PSA TOTAL: CPT

## 2025-07-22 PROCEDURE — 36415 COLL VENOUS BLD VENIPUNCTURE: CPT

## 2025-07-22 PROCEDURE — 85025 COMPLETE CBC W/AUTO DIFF WBC: CPT

## 2025-07-22 PROCEDURE — 80053 COMPREHEN METABOLIC PANEL: CPT

## 2025-07-22 PROCEDURE — 81003 URINALYSIS AUTO W/O SCOPE: CPT

## 2025-07-23 ENCOUNTER — OFFICE VISIT (OUTPATIENT)
Dept: PRIMARY CARE CLINIC | Facility: CLINIC | Age: 80
End: 2025-07-23
Payer: MEDICARE

## 2025-07-23 VITALS
BODY MASS INDEX: 28.63 KG/M2 | HEART RATE: 80 BPM | OXYGEN SATURATION: 95 % | SYSTOLIC BLOOD PRESSURE: 111 MMHG | HEIGHT: 73 IN | DIASTOLIC BLOOD PRESSURE: 73 MMHG | TEMPERATURE: 98 F | WEIGHT: 216 LBS

## 2025-07-23 DIAGNOSIS — F33.40 DEPRESSION, MAJOR, RECURRENT, IN REMISSION: ICD-10-CM

## 2025-07-23 DIAGNOSIS — N40.1 BENIGN PROSTATIC HYPERPLASIA WITH WEAK URINARY STREAM: ICD-10-CM

## 2025-07-23 DIAGNOSIS — Z23 IMMUNIZATION DUE: ICD-10-CM

## 2025-07-23 DIAGNOSIS — K76.0 FATTY LIVER: ICD-10-CM

## 2025-07-23 DIAGNOSIS — E66.3 OVERWEIGHT WITH BODY MASS INDEX (BMI) OF 28 TO 28.9 IN ADULT: ICD-10-CM

## 2025-07-23 DIAGNOSIS — K21.9 GASTROESOPHAGEAL REFLUX DISEASE, UNSPECIFIED WHETHER ESOPHAGITIS PRESENT: ICD-10-CM

## 2025-07-23 DIAGNOSIS — M50.30 DEGENERATIVE DISC DISEASE, CERVICAL: ICD-10-CM

## 2025-07-23 DIAGNOSIS — I70.0 AORTIC ATHEROSCLEROSIS: ICD-10-CM

## 2025-07-23 DIAGNOSIS — Z00.00 MEDICARE ANNUAL WELLNESS VISIT, SUBSEQUENT: Primary | ICD-10-CM

## 2025-07-23 DIAGNOSIS — H90.3 SENSORINEURAL HEARING LOSS (SNHL) OF BOTH EARS: ICD-10-CM

## 2025-07-23 DIAGNOSIS — H40.1133 PRIMARY OPEN ANGLE GLAUCOMA (POAG) OF BOTH EYES, SEVERE STAGE: ICD-10-CM

## 2025-07-23 DIAGNOSIS — F33.1 MAJOR DEPRESSIVE DISORDER, RECURRENT, MODERATE: ICD-10-CM

## 2025-07-23 DIAGNOSIS — F41.1 GENERALIZED ANXIETY DISORDER: ICD-10-CM

## 2025-07-23 DIAGNOSIS — R39.12 BENIGN PROSTATIC HYPERPLASIA WITH WEAK URINARY STREAM: ICD-10-CM

## 2025-07-23 DIAGNOSIS — H40.9 GLAUCOMA OF BOTH EYES, UNSPECIFIED GLAUCOMA TYPE: ICD-10-CM

## 2025-07-23 DIAGNOSIS — E78.2 MIXED HYPERLIPIDEMIA: ICD-10-CM

## 2025-07-23 DIAGNOSIS — I25.119 CORONARY ARTERY DISEASE INVOLVING NATIVE CORONARY ARTERY OF NATIVE HEART WITH ANGINA PECTORIS: ICD-10-CM

## 2025-07-23 DIAGNOSIS — Z12.5 SCREENING PSA (PROSTATE SPECIFIC ANTIGEN): ICD-10-CM

## 2025-07-23 NOTE — ASSESSMENT & PLAN NOTE
07/23/2025:  Patient presents for wellness examination.    He has been feeling well.  Cardio: Dr. Magana; coronary artery disease.  Urologist: Dr. Powers; BPH, history of kidney stones, sees him twice year.  Last colonoscopy 2 years ago; no further colonoscopies secondary to age.  Patient with anxiety and depression; these are stable on venlafaxine.  Patient advised to consider a Tdap booster and RSV vaccine; benefits and risks reviewed.  Patient with bilateral sensorineural hearing loss; has a hearing aids which do not work well.   Patient with primary open-angle glaucoma; followed by Dr. Hema Townsend.   Labs reviewed.

## 2025-07-23 NOTE — ASSESSMENT & PLAN NOTE
Patient advised to consider a Tdap booster and RSV vaccine; benefits and risks reviewed with patient .

## 2025-07-23 NOTE — ASSESSMENT & PLAN NOTE
His liver enzymes are normal.  Limit intake of sugary foods, refined carbohydrates and fatty foods.    Patient encouraged to lose weight.

## 2025-07-23 NOTE — ASSESSMENT & PLAN NOTE
Lipid profile: Total cholesterol 168, HDL 44, triglycerides 319 and LDL 60.  Patient advised to limit intake of sugary foods, refined carbohydrates and fatty foods.  Patient encouraged to lose weight.  Continue rosuvastatin.

## 2025-07-30 ENCOUNTER — TELEPHONE (OUTPATIENT)
Dept: PRIMARY CARE CLINIC | Facility: CLINIC | Age: 80
End: 2025-07-30
Payer: MEDICARE

## 2025-07-30 NOTE — TELEPHONE ENCOUNTER
----- Message from Tomasz Moore MD sent at 7/23/2025  4:41 PM CDT -----  Please forward PSA to his urologist, Dr. Jose Powers.